# Patient Record
Sex: FEMALE | Race: WHITE | NOT HISPANIC OR LATINO | Employment: FULL TIME | URBAN - METROPOLITAN AREA
[De-identification: names, ages, dates, MRNs, and addresses within clinical notes are randomized per-mention and may not be internally consistent; named-entity substitution may affect disease eponyms.]

---

## 2017-01-11 ENCOUNTER — TELEPHONE (OUTPATIENT)
Dept: ONCOLOGY | Facility: CLINIC | Age: 47
End: 2017-01-11

## 2017-01-11 ENCOUNTER — TRANSCRIBE ORDERS (OUTPATIENT)
Dept: CARDIOLOGY | Facility: CLINIC | Age: 47
End: 2017-01-11

## 2017-01-11 DIAGNOSIS — R07.9 CHEST PAIN, UNSPECIFIED TYPE: Primary | ICD-10-CM

## 2017-01-11 NOTE — TELEPHONE ENCOUNTER
----- Message from Leesa Grullon sent at 1/11/2017  2:13 PM EST -----  Regarding: JERED / SUSANA  Contact: [phone]  PLEASE CALL PATIENT.  IS IT OKAY FOR HER TO TAKE TRUVIA AND STEVIA DUE TO HER PAST BREAST CANCER......SHE JUST NEEDS TO KNOW IF IT IS OKAY.

## 2017-01-24 ENCOUNTER — CONSULT (OUTPATIENT)
Dept: CARDIOLOGY | Facility: CLINIC | Age: 47
End: 2017-01-24

## 2017-01-24 VITALS
DIASTOLIC BLOOD PRESSURE: 70 MMHG | WEIGHT: 188 LBS | BODY MASS INDEX: 33.31 KG/M2 | HEIGHT: 63 IN | SYSTOLIC BLOOD PRESSURE: 110 MMHG | HEART RATE: 76 BPM

## 2017-01-24 DIAGNOSIS — E88.81 METABOLIC SYNDROME: ICD-10-CM

## 2017-01-24 DIAGNOSIS — R68.84 JAW PAIN: ICD-10-CM

## 2017-01-24 DIAGNOSIS — C50.912 RECURRENT CANCER OF LEFT BREAST (HCC): ICD-10-CM

## 2017-01-24 DIAGNOSIS — R42 DIZZINESS: ICD-10-CM

## 2017-01-24 DIAGNOSIS — R07.89 CHEST PRESSURE: Primary | ICD-10-CM

## 2017-01-24 DIAGNOSIS — E78.00 HYPERCHOLESTEREMIA: ICD-10-CM

## 2017-01-24 PROCEDURE — 93000 ELECTROCARDIOGRAM COMPLETE: CPT | Performed by: INTERNAL MEDICINE

## 2017-01-24 PROCEDURE — 99244 OFF/OP CNSLTJ NEW/EST MOD 40: CPT | Performed by: INTERNAL MEDICINE

## 2017-01-24 RX ORDER — ATORVASTATIN CALCIUM 20 MG/1
20 TABLET, FILM COATED ORAL DAILY
Qty: 90 TABLET | Refills: 3 | Status: SHIPPED | OUTPATIENT
Start: 2017-01-24 | End: 2017-10-04 | Stop reason: ALTCHOICE

## 2017-01-24 NOTE — LETTER
January 24, 2017     Ryan Madera MD  [address] [place] [id number]    Patient: Asuncion Flynn   YOB: 1970   Date of Visit: 1/24/2017       Dear Dr. Santy MD:    Thank you for referring Asuncion Flynn to me for evaluation. Below are the relevant portions of my assessment and plan of care.       Subjective   Asuncion Flynn is a 47 y.o. female came in today for her initial cardiac evaluation.  She has no previously diagnosed hypertension and diabetes.  She does have history of hypercholesterolemia, was been treated with diet.  She also has history of breast cancer, for which she has undergone surgery and chemotherapy.  She has been to the emergency room secondary to significant jaw pain radiating down into the upper part of the chest.  It lasted for few hours.  They kept her in the ER and apparently told it was not an MI and was sent home.  The symptoms subsided by the next day, and since then she had few episodes of such discomfort but they but not as significant and that long lasting.  Her lab work which was done recently showed that the LDL has gone up to 194.  Her liver functions were within normal limit.  She is also under a lot of stress recently.              Cardiac History  Past Surgical History   Procedure Laterality Date   • Mastectomy Bilateral 05/2011   • Echo - converted  12/04/2013     @[facility]. . EF 65%.       Current Outpatient Prescriptions   Medication Sig Dispense Refill   • escitalopram (LEXAPRO) 10 MG tablet Take 0.25 tablets by mouth Daily.  5   • loratadine (CLARITIN) 10 MG tablet Take 10 mg by mouth Daily.     • omeprazole (PriLOSEC) 40 MG capsule Take 1 capsule by mouth Daily.  2   • atorvastatin (LIPITOR) 20 MG tablet Take 1 tablet by mouth Daily. 90 tablet 3     No current facility-administered medications for this visit.        Allergies:  Ciprofloxacin; Codeine; Erythromycin; Keflex [cephalexin]; Morphine and related; Penicillins; and Sulfa  antibiotics              Assessment/Plan   At baseline her heart rate and blood pressure appears stable.  Her EKG showed sinus rhythm, small QRS complex and nonspecific ST-T changes.  Her clinical examination is unremarkable.  Had a long talk with her about her symptoms and also about her labs.  Since the LDL has been going up, I think she needs to be on a statin.  Lipitor 20 mg once a day was started.  Some of her symptoms is concerning.  I scheduled her to undergo a stress test to evaluate her functional status, chronotropic response and also to rule out ischemia.  She also need an echocardiogram to evaluate the LV function and also to evaluate for any pericardial effusion.  Based on the results, further recommendations will be made..  Asuncion was seen today for establish care and palpitations.    Diagnoses and all orders for this visit:    Chest pressure  -     Stress Test With Myocardial Perfusion One Day; Future  -     Stress Test With Myocardial Perfusion One Day    Jaw pain  -     Stress Test With Myocardial Perfusion One Day; Future  -     Stress Test With Myocardial Perfusion One Day    Dizziness  -     Stress Test With Myocardial Perfusion One Day; Future  -     Adult Transthoracic Echo Complete; Future  -     Stress Test With Myocardial Perfusion One Day  -     Adult Transthoracic Echo Complete    Hypercholesteremia    Metabolic syndrome  -     Adult Transthoracic Echo Complete; Future  -     Adult Transthoracic Echo Complete    Recurrent cancer of left breast  -     CBC & Differential  -     Comprehensive Metabolic Panel    Other orders  -     atorvastatin (LIPITOR) 20 MG tablet; Take 1 tablet by mouth Daily.                   If you have questions, please do not hesitate to call me. I look forward to following Asuncion along with you.         Sincerely,        Silas Davis MD        CC: No Recipients

## 2017-01-24 NOTE — MR AVS SNAPSHOT
[facility] CARDIOLOGY  [phone]                    Asuncion Flynn   1/24/2017 10:15 AM   Consult    Dept Phone:  [phone]   Encounter #:  [id number]    Provider:  Silas Davis MD   Department:  [facility] CARDIOLOGY                Your Full Care Plan              Today's Medication Changes          These changes are accurate as of: 1/24/17 10:51 AM.  If you have any questions, ask your nurse or doctor.               New Medication(s)Ordered:     atorvastatin 20 MG tablet   Commonly known as:  LIPITOR   Take 1 tablet by mouth Daily.   Started by:  Silas Davis MD            Where to Get Your Medications      These medications were sent to [facility]/pharmacy #[id number] - [place], [place] - [address] [address] - [phone]  - [phone]   [address] [address]     Phone:  [phone]     atorvastatin 20 MG tablet                  Your Updated Medication List          This list is accurate as of: 1/24/17 10:51 AM.  Always use your most recent med list.                atorvastatin 20 MG tablet   Commonly known as:  LIPITOR   Take 1 tablet by mouth Daily.       CLARITIN 10 MG tablet   Generic drug:  loratadine       escitalopram 10 MG tablet   Commonly known as:  LEXAPRO       omeprazole 40 MG capsule   Commonly known as:  priLOSEC               You Were Diagnosed With        Codes Comments    Chest pressure    -  Primary ICD-10-CM: R07.89  ICD-9-CM: 786.59     Jaw pain     ICD-10-CM: R68.84  ICD-9-CM: 784.92     Dizziness     ICD-10-CM: R42  ICD-9-CM: 780.4     Hypercholesteremia     ICD-10-CM: E78.00  ICD-9-CM: 272.0     Metabolic syndrome     ICD-10-CM: E88.81  ICD-9-CM: 277.7       Instructions     None    Patient Instructions History      Upcoming Appointments     Visit Type Date Time Department    CONSULT 1/24/2017 10:15 AM [facility]    FOLLOW UP 1 UNIT 4/5/2017 11:30 AM [facility] [place]    FOLLOW UP 7/27/2017  2:30 PM [facility] C[facility]  "[facility]      MyChart Signup     Our records indicate that you have declined [facility] MyChart signup. If you would like to sign up for [employer], please email [email or web address] or call [phone] to obtain an activation code.             Other Info from Your Visit           Your Appointments     Apr 05, 2017 11:30 AM EDT   FOLLOW UP with Chaz Lamb MD   [facility] HEMATOLOGY  AND ONCOLOGY ([place])    [address] [place] [address], [address]  [facility] [address]   [phone]            Jul 27, 2017  2:30 PM EDT   Follow Up with Silas Davis MD   [facility] CARDIOLOGY (--)    [address] [place] [address] [phone]           Arrive 15 minutes prior to appointment.              Allergies     Ciprofloxacin      Codeine Allergy     Erythromycin Allergy     Keflex [Cephalexin] Allergy     Morphine And Related Allergy     Penicillins Allergy     Sulfa Antibiotics Allergy       Reason for Visit     Establish Care Patient states she went to ER at [facility] due to severe jaw pain with nausea. She has been feeling very tired.    Palpitations Having alot of palpitations and pounding heart beats. She has had some lightheadedness.      Vital Signs     Blood Pressure Pulse Height Weight Body Mass Index Smoking Status    110/70 (BP Location: Right arm) 76 63\" (160 cm) 188 lb (85.3 kg) 33.3 kg/m2 Former Smoker      Problems and Diagnoses Noted     Chest pressure    -  Primary    Jaw pain        Dizziness        High cholesterol        Metabolic syndrome            "

## 2017-02-01 ENCOUNTER — OUTSIDE FACILITY SERVICE (OUTPATIENT)
Dept: CARDIOLOGY | Facility: CLINIC | Age: 47
End: 2017-02-01

## 2017-02-01 PROCEDURE — 93018 CV STRESS TEST I&R ONLY: CPT | Performed by: INTERNAL MEDICINE

## 2017-02-01 PROCEDURE — 78452 HT MUSCLE IMAGE SPECT MULT: CPT | Performed by: INTERNAL MEDICINE

## 2017-02-01 PROCEDURE — 93306 TTE W/DOPPLER COMPLETE: CPT | Performed by: INTERNAL MEDICINE

## 2017-02-07 ENCOUNTER — TELEPHONE (OUTPATIENT)
Dept: CARDIOLOGY | Facility: CLINIC | Age: 47
End: 2017-02-07

## 2017-02-07 NOTE — TELEPHONE ENCOUNTER
Patient called asking for results of echo and stress. Stress not in chart but patient states that she had done at [facility] the same day as the echo. Went over echo results. Will obtain stress and call patient with results.

## 2017-02-13 NOTE — TELEPHONE ENCOUNTER
Negative stress. As long she has no more CP, continue meds. If she is still having Chest pressure, then  Need Cath

## 2017-04-05 ENCOUNTER — OFFICE VISIT (OUTPATIENT)
Dept: ONCOLOGY | Facility: CLINIC | Age: 47
End: 2017-04-05

## 2017-04-05 VITALS
WEIGHT: 190 LBS | TEMPERATURE: 97.5 F | BODY MASS INDEX: 33.66 KG/M2 | RESPIRATION RATE: 16 BRPM | HEART RATE: 84 BPM | HEIGHT: 63 IN | DIASTOLIC BLOOD PRESSURE: 75 MMHG | SYSTOLIC BLOOD PRESSURE: 126 MMHG

## 2017-04-05 DIAGNOSIS — C50.912 RECURRENT CANCER OF LEFT BREAST (HCC): ICD-10-CM

## 2017-04-05 DIAGNOSIS — F41.9 CHRONIC ANXIETY: Primary | Chronic | ICD-10-CM

## 2017-04-05 PROCEDURE — 99213 OFFICE O/P EST LOW 20 MIN: CPT | Performed by: INTERNAL MEDICINE

## 2017-04-05 RX ORDER — FLUTICASONE PROPIONATE 50 MCG
SPRAY, SUSPENSION (ML) NASAL
Refills: 3 | COMMUNITY
Start: 2017-03-13 | End: 2017-10-04

## 2017-04-05 NOTE — PROGRESS NOTES
Chief Complaint   Follow-up axillary recurrent left breast cancer-original left breast cancer diagnosed April 2011, followed by bilateral mastectomies with permanent prostheses, hormone positive and HER-2 negative with 8 negative lymph nodes, with subsequent left axillary lymph node recurrence November 2013 with 3 of 4 axillary lymph nodes involved by tumor, status post surgical resection followed by systemic chemotherapy and several attempts at hormonal manipulation-hormone blocking therapy not tolerated by patient    PROBLEM LIST   Patient Active Problem List   Diagnosis   • Recurrent cancer of left breast   • Chronic anxiety       HISTORY OF PRESENT ILLNESS:   Approaching 4 years from her axillary recurrence.  Feeling well.  Working regularly.  Back in December she had bilateral jaw pain that was fairly severe that occurred out of nowhere.  She was seen by her local emergency room physician and workup was negative in terms of any cardiac issues.  This was confirmed by formal cardiology evaluation.  She's had a very mild version of this happen very briefly since on only one occasion.  She does clench her jaw a lot of work when she is concentrating or working.    Past Medical History, Past Surgical History, Social History, Family History have been reviewed and are without significant changes except as mentioned.      Medications:      Current Outpatient Prescriptions:   •  atorvastatin (LIPITOR) 20 MG tablet, Take 1 tablet by mouth Daily., Disp: 90 tablet, Rfl: 3  •  escitalopram (LEXAPRO) 10 MG tablet, Take 0.25 tablets by mouth Daily., Disp: , Rfl: 5  •  fluticasone (FLONASE) 50 MCG/ACT nasal spray, USE 1 SPRAY IN EACH NOSTRIL DAILY, Disp: , Rfl: 3  •  loratadine (CLARITIN) 10 MG tablet, Take 10 mg by mouth Daily., Disp: , Rfl:   •  omeprazole (PriLOSEC) 40 MG capsule, Take 1 capsule by mouth Daily., Disp: , Rfl: 2    ALLERGIES:    Allergies   Allergen Reactions   • Ciprofloxacin    • Codeine    • Erythromycin   "  • Keflex [Cephalexin]    • Morphine And Related    • Penicillins    • Sulfa Antibiotics        ROS:  Review of Systems   Constitutional: Negative for activity change and appetite change.   HENT: Negative for mouth sores, sinus pressure and voice change.    Eyes: Negative for visual disturbance.   Respiratory: Negative for shortness of breath.    Cardiovascular: Negative for chest pain.   Gastrointestinal: Negative for abdominal pain and vomiting.   Genitourinary: Negative for dysuria.   Musculoskeletal: Negative for arthralgias and myalgias.   Skin: Negative for color change.   Neurological: Negative for dizziness, syncope and headaches.   Hematological: Negative for adenopathy.   Psychiatric/Behavioral: Negative for confusion, sleep disturbance and suicidal ideas. The patient is not nervous/anxious.          Objective:    /75  Pulse 84  Temp 97.5 °F (36.4 °C) (Temporal Artery )   Resp 16  Ht 63\" (160 cm)  Wt 190 lb (86.2 kg)  BMI 33.66 kg/m2    Physical Exam   Constitutional: She is oriented to person, place, and time. She appears well-developed and well-nourished. No distress.   Appears healthy youthful and vigorous.   HENT:   Head: Normocephalic.   Mouth/Throat: Oropharynx is clear and moist.   Asymmetric sclera nonicteric jaw muscles normal no jaw tenderness no lymph nodes palpable in the head and neck or for that matter anywhere else   Eyes: Conjunctivae and EOM are normal. No scleral icterus.   Neck: Normal range of motion. Neck supple. No thyromegaly present.   Cardiovascular: Normal rate, regular rhythm and normal heart sounds.    No murmur heard.  Pulmonary/Chest: Effort normal and breath sounds normal. She has no wheezes. She has no rales.   Breasts prostheses in place bilaterally with excellent cosmesis.  No worrisome mass palpable   Abdominal: Soft. Bowel sounds are normal. She exhibits no distension and no mass. There is no tenderness.   Musculoskeletal: She exhibits no edema or " tenderness.   Lymphadenopathy:     She has no cervical adenopathy.   Neurological: She is alert and oriented to person, place, and time. She displays normal reflexes. No cranial nerve deficit.   Skin: Skin is warm and dry. No rash noted.   Psychiatric: She has a normal mood and affect. Judgment normal.   Vitals reviewed.             RECENT LABS:  Hematology WBC   Date Value Ref Range Status   03/10/2014 4.90 3.50 - 10.80 K/mcL Final     Hemoglobin   Date Value Ref Range Status   03/10/2014 11.8 11.5 - 15.5 g/dL Final     Hematocrit   Date Value Ref Range Status   03/10/2014 34.9 34.5 - 44.0 % Final     MCV   Date Value Ref Range Status   03/10/2014 95.8 80.0 - 99.0 fL Final     RDW   Date Value Ref Range Status   03/10/2014 15.5 (H) 11.3 - 14.5 % Final     MPV   Date Value Ref Range Status   03/10/2014 7.1 fL Final     Platelets   Date Value Ref Range Status   03/10/2014 320 150 - 450 K/mcL Final     Neutrophils Absolute   Date Value Ref Range Status   03/10/2014 2.70 1.50 - 8.30 K/mcL Final     Lymphocytes Absolute   Date Value Ref Range Status   03/10/2014 1.60 0.60 - 4.80 K/mcL Final     Monocytes Absolute   Date Value Ref Range Status   03/10/2014 0.60 0.00 - 1.00 K/mcL Final       Glucose   Date Value Ref Range Status   03/10/2014 108 (H) 70 - 100 mg/dL Final     Sodium   Date Value Ref Range Status   03/10/2014 140 136 - 145 mmol/L Final     Potassium   Date Value Ref Range Status   03/10/2014 4.2 3.4 - 5.4 mmol/L Final     CO2   Date Value Ref Range Status   03/10/2014 29 20 - 31 mmol/L Final     Chloride   Date Value Ref Range Status   03/10/2014 105 98 - 107 mmol/L Final     Anion Gap   Date Value Ref Range Status   03/10/2014 6 3 - 11 mmol/L Final     Creatinine   Date Value Ref Range Status   03/10/2014 0.7 0.6 - 1.3 mg/dL Final     BUN   Date Value Ref Range Status   03/10/2014 15 6 - 20 mg/dL Final     Calcium   Date Value Ref Range Status   03/10/2014 8.7 8.7 - 10.4 mg/dL Final     Alkaline Phosphatase    Date Value Ref Range Status   03/10/2014 128 (H) 25 - 100 Units/L Final     Total Protein   Date Value Ref Range Status   03/10/2014 6.6 6.4 - 8.3 g/dL Final     ALT (SGPT)   Date Value Ref Range Status   03/10/2014 41 (H) 7 - 40 Units/L Final     AST (SGOT)   Date Value Ref Range Status   03/10/2014 30 8 - 33 Units/L Final     Total Bilirubin   Date Value Ref Range Status   03/10/2014 0.4 0.3 - 1.2 mg/dL Final     Albumin   Date Value Ref Range Status   03/10/2014 4.3 3.4 - 4.8 g/dL Final          Perf Status: 0    Assessment/Plan    Diagnoses and all orders for this visit:    Chronic anxiety    Recurrent cancer of left breast      Regionally recurrent left breast cancer-no evidence of further recurrence.  The patient is doing well.    Bilateral jaw pain several months ago which I think was very likely muscle spasm.  Reassurance given.  I'll otherwise see her back in 6 months or so    Chaz Lamb MD , 4/5/2017    CC:

## 2017-06-09 ENCOUNTER — TELEPHONE (OUTPATIENT)
Dept: ONCOLOGY | Facility: CLINIC | Age: 47
End: 2017-06-09

## 2017-06-09 NOTE — TELEPHONE ENCOUNTER
----- Message from Martina Gaston sent at 6/9/2017  3:21 PM EDT -----  Regarding: JERED-QUESTIONS  Contact: [phone]  Patient called can she have sugar free things now, and can she take herbs to loose weight? Please call.

## 2017-06-09 NOTE — TELEPHONE ENCOUNTER
Per BENJIE Gray there is no reason why she cannot have sugarfree products or herbs to loose weight.  She should check with her primary care on the specific herbs for loosing weight.  Patient stated understanding.

## 2017-06-21 ENCOUNTER — TELEPHONE (OUTPATIENT)
Dept: ONCOLOGY | Facility: CLINIC | Age: 47
End: 2017-06-21

## 2017-06-21 NOTE — TELEPHONE ENCOUNTER
Per Dr. Lamb,  Order a cheap compression sleeve, not too tight.  Refer to PT for lymphadema.  [place].

## 2017-06-21 NOTE — TELEPHONE ENCOUNTER
Pt said she has an appt with dr mckeon on the  28th   Please wait to schedule her physical therapy until after she talks to him.     This message is shelly              Noted by Shelly

## 2017-06-21 NOTE — TELEPHONE ENCOUNTER
----- Message from Martina PATIÑO Marilin sent at 6/20/2017  1:36 PM EDT -----  Regarding: JERED- LYMPH NODES IN ARM  Contact: [phone]  Patient called her left arm where are the lymph nodes were removed it has been hurting for the past 6 months when she gets home she elevates this and it feels better but seems to be an everyday thing has been taking tylenol when she can't handle it some swelling but not bad could this be arthritis.

## 2017-06-28 ENCOUNTER — OFFICE VISIT (OUTPATIENT)
Dept: ONCOLOGY | Facility: CLINIC | Age: 47
End: 2017-06-28

## 2017-06-28 VITALS
SYSTOLIC BLOOD PRESSURE: 130 MMHG | TEMPERATURE: 98 F | WEIGHT: 197 LBS | RESPIRATION RATE: 16 BRPM | DIASTOLIC BLOOD PRESSURE: 75 MMHG | HEIGHT: 63 IN | BODY MASS INDEX: 34.91 KG/M2 | HEART RATE: 77 BPM

## 2017-06-28 DIAGNOSIS — I89.0 LYMPHEDEMA OF LEFT UPPER EXTREMITY: Primary | ICD-10-CM

## 2017-06-28 PROCEDURE — 99213 OFFICE O/P EST LOW 20 MIN: CPT | Performed by: INTERNAL MEDICINE

## 2017-07-07 ENCOUNTER — TELEPHONE (OUTPATIENT)
Dept: ONCOLOGY | Facility: CLINIC | Age: 47
End: 2017-07-07

## 2017-07-07 NOTE — TELEPHONE ENCOUNTER
Spoke with patient. Her insurance prefers she have physical therapy through [facility]. She has an evaluation set up for 7/13/17. I called [facility] and gave them my number in case they need any information for the appointment.

## 2017-07-20 ENCOUNTER — DOCUMENTATION (OUTPATIENT)
Dept: CARDIOLOGY | Facility: CLINIC | Age: 47
End: 2017-07-20

## 2017-10-04 ENCOUNTER — OFFICE VISIT (OUTPATIENT)
Dept: ONCOLOGY | Facility: CLINIC | Age: 47
End: 2017-10-04

## 2017-10-04 VITALS
HEART RATE: 73 BPM | SYSTOLIC BLOOD PRESSURE: 127 MMHG | TEMPERATURE: 97.8 F | WEIGHT: 195.6 LBS | HEIGHT: 63 IN | RESPIRATION RATE: 16 BRPM | BODY MASS INDEX: 34.66 KG/M2 | DIASTOLIC BLOOD PRESSURE: 80 MMHG

## 2017-10-04 DIAGNOSIS — C50.912 RECURRENT CANCER OF LEFT BREAST (HCC): Primary | ICD-10-CM

## 2017-10-04 DIAGNOSIS — I89.0 LYMPHEDEMA OF LEFT UPPER EXTREMITY: ICD-10-CM

## 2017-10-04 PROCEDURE — 99214 OFFICE O/P EST MOD 30 MIN: CPT | Performed by: INTERNAL MEDICINE

## 2017-10-04 RX ORDER — PANTOPRAZOLE SODIUM 40 MG/1
40 TABLET, DELAYED RELEASE ORAL DAILY
COMMUNITY
End: 2018-05-17

## 2017-10-04 NOTE — PROGRESS NOTES
Chief Complaint   Follow-up axillary recurrence of left breast cancer-C chief complaint of April 5, 2017's office note for complete details    PROBLEM LIST   Patient Active Problem List   Diagnosis   • Recurrent cancer of left breast   • Chronic anxiety   • Lymphedema of left upper extremity       HISTORY OF PRESENT ILLNESS:   This very pleasant 47-year-old lady returns for follow-up.  She continues to work.  Overall her health is been pretty good.  She continues to have intermittent albeit mild issues with her chronic left upper extremity lymphedema.  She also notices some facial twitching from time to time which does not seem to be provoked but which does seem to improve when she sits down and tries to relax.  She's had no headache.  She's had no syncopal episodes.    Her chronic anxiety definitely continues to be an issue.  She is also worried about her memory retention and whether or not she might have chemo brain.  Her mother who is with her reports that she does seem to forget a number of small items but is never forgotten anything that is led to any count of bodily harm or any kind of problems with her children and her family.    Past Medical History, Past Surgical History, Social History, Family History have been reviewed and are without significant changes except as mentioned.      Medications:      Current Outpatient Prescriptions:   •  escitalopram (LEXAPRO) 10 MG tablet, Take 0.25 tablets by mouth Daily., Disp: , Rfl: 5  •  loratadine (CLARITIN) 10 MG tablet, Take 10 mg by mouth Daily., Disp: , Rfl:   •  pantoprazole (PROTONIX) 40 MG EC tablet, Take 40 mg by mouth Daily., Disp: , Rfl:     ALLERGIES:    Allergies   Allergen Reactions   • Ciprofloxacin    • Codeine    • Erythromycin    • Keflex [Cephalexin]    • Morphine And Related    • Penicillins    • Percocet [Oxycodone-Acetaminophen] Nausea And Vomiting   • Sulfa Antibiotics    • Fluticasone Anxiety and Tinnitus       ROS:  Review of Systems  "  Constitutional: Negative for activity change and appetite change.   HENT: Negative for mouth sores, sinus pressure and voice change.    Eyes: Negative for visual disturbance.   Respiratory: Negative for shortness of breath.    Cardiovascular: Negative for chest pain.   Gastrointestinal: Negative for abdominal pain and vomiting.   Genitourinary: Negative for dysuria.   Musculoskeletal: Negative for arthralgias and myalgias.        Chronic left upper extremity lymphedema symptoms mentioned   Skin: Negative for color change.   Neurological: Negative for dizziness, syncope and headaches.        Mild chronic stable neurologic symptoms-quite intermittent-mentioned above   Hematological: Negative for adenopathy.   Psychiatric/Behavioral: Negative for confusion, sleep disturbance and suicidal ideas. The patient is not nervous/anxious.         Chronic anxiety mentioned above           Objective:    /80  Pulse 73  Temp 97.8 °F (36.6 °C) (Temporal Artery )   Resp 16  Ht 63\" (160 cm)  Wt 195 lb 9.6 oz (88.7 kg)  BMI 34.65 kg/m2    Physical Exam   Constitutional: She is oriented to person, place, and time. She appears well-developed and well-nourished. No distress.   Awake alert smiles readily.  On the other hand she remains somewhat anxious in appearance and she actually teared once while she was here in the office.  She has excellent insight.   HENT:   Head: Normocephalic.   Mouth/Throat: Oropharynx is clear and moist.   Eyes: Conjunctivae and EOM are normal. No scleral icterus.   Neck: Normal range of motion. Neck supple. No thyromegaly present.   Cardiovascular: Normal rate, regular rhythm and normal heart sounds.    No murmur heard.  Pulmonary/Chest: Effort normal and breath sounds normal. She has no wheezes. She has no rales.   Bilateral breast implants in place with good cosmesis   Abdominal: Soft. Bowel sounds are normal. She exhibits no distension and no mass. There is no tenderness.   Musculoskeletal: She " exhibits no edema or tenderness.   Lymphadenopathy:     She has no cervical adenopathy.   Neurological: She is alert and oriented to person, place, and time. She displays normal reflexes. No cranial nerve deficit.   Skin: Skin is warm and dry. No rash noted.   Psychiatric: She has a normal mood and affect. Judgment normal.   Vitals reviewed.             RECENT LABS:  Hematology WBC   Date Value Ref Range Status   03/10/2014 4.90 3.50 - 10.80 K/mcL Final     Hemoglobin   Date Value Ref Range Status   03/10/2014 11.8 11.5 - 15.5 g/dL Final     Hematocrit   Date Value Ref Range Status   03/10/2014 34.9 34.5 - 44.0 % Final     MCV   Date Value Ref Range Status   03/10/2014 95.8 80.0 - 99.0 fL Final     RDW   Date Value Ref Range Status   03/10/2014 15.5 (H) 11.3 - 14.5 % Final     MPV   Date Value Ref Range Status   03/10/2014 7.1 fL Final     Platelets   Date Value Ref Range Status   03/10/2014 320 150 - 450 K/mcL Final     Neutrophils Absolute   Date Value Ref Range Status   03/10/2014 2.70 1.50 - 8.30 K/mcL Final     Lymphocytes Absolute   Date Value Ref Range Status   03/10/2014 1.60 0.60 - 4.80 K/mcL Final     Monocytes Absolute   Date Value Ref Range Status   03/10/2014 0.60 0.00 - 1.00 K/mcL Final       Glucose   Date Value Ref Range Status   03/10/2014 108 (H) 70 - 100 mg/dL Final     Sodium   Date Value Ref Range Status   03/10/2014 140 136 - 145 mmol/L Final     Potassium   Date Value Ref Range Status   03/10/2014 4.2 3.4 - 5.4 mmol/L Final     CO2   Date Value Ref Range Status   03/10/2014 29 20 - 31 mmol/L Final     Chloride   Date Value Ref Range Status   03/10/2014 105 98 - 107 mmol/L Final     Anion Gap   Date Value Ref Range Status   03/10/2014 6 3 - 11 mmol/L Final     Creatinine   Date Value Ref Range Status   03/10/2014 0.7 0.6 - 1.3 mg/dL Final     BUN   Date Value Ref Range Status   03/10/2014 15 6 - 20 mg/dL Final     Calcium   Date Value Ref Range Status   03/10/2014 8.7 8.7 - 10.4 mg/dL Final      Alkaline Phosphatase   Date Value Ref Range Status   03/10/2014 128 (H) 25 - 100 Units/L Final     Total Protein   Date Value Ref Range Status   03/10/2014 6.6 6.4 - 8.3 g/dL Final     ALT (SGPT)   Date Value Ref Range Status   03/10/2014 41 (H) 7 - 40 Units/L Final     AST (SGOT)   Date Value Ref Range Status   03/10/2014 30 8 - 33 Units/L Final     Total Bilirubin   Date Value Ref Range Status   03/10/2014 0.4 0.3 - 1.2 mg/dL Final     Albumin   Date Value Ref Range Status   03/10/2014 4.3 3.4 - 4.8 g/dL Final          Perf Status:0    Assessment/Plan    Diagnoses and all orders for this visit:    Recurrent cancer of left breast    Lymphedema of left upper extremity      I think that Asuncion is doing very nicely.  She is now over 4 years out from her left axillary recurrence and has no evidence of further recurrence.  Her gynecologist suggested that she try Avista adjuvantly.  I would be happy to do this but the patient is not terribly inclined to do so and I have to say that every single drug that I tried her on, regardless of dose reduction and frequency reduction, were not tolerated.  At this late date I can't say that putting her on anything at this point would be beneficial anyway.    I suspect that her memory as I should quite normal for age and degree of anxiety.  She has always been anxious and as she well knows, this contributes to some of her memory issues.  I offered to send her to a neurologist for formal cognitive testing but she really doesn't want to do that.  I think she is more interested in the reassurance that I can give her.    Mike left upper cervical lymphedema-minimal at this point.    Left facial twitching.  This is intermittent and not progressive.  Again I suspect this is probably related to her anxiety.  I offered to refer her to a neurologist re guarding this as well.  At present we will merely monitor this.  I'll plan on seeing her back in 3 months or so.  I told her to call me if  she had any issues whatsoever.  She is of very very dear woman who has done very well but who has a real issue with her chronic anxiety.      Chaz Lamb MD , 10/4/2017    CC:

## 2017-10-18 ENCOUNTER — TELEPHONE (OUTPATIENT)
Dept: ONCOLOGY | Facility: CLINIC | Age: 47
End: 2017-10-18

## 2017-10-18 NOTE — TELEPHONE ENCOUNTER
----- Message from Jemma Zapata sent at 10/18/2017  1:25 PM EDT -----  Regarding: JERED - NEEDS LETTER FOR DISABILITY   Contact: [phone]  PATIENT CALLED NEEDING A LETTER REGARDING HER CONCENTRATION AND FOCUS. SHE IS NEEDING THIS FOR DISABILITY BECAUSE SHE THINKS SHE IS GOING TO LOOSE HER JOB.     SHE WOULD LIKE TO TALK TO DR. LAWTON ABOUT THIS.

## 2017-10-18 NOTE — TELEPHONE ENCOUNTER
Per Dr. Lamb, we can refer patient to neurology for her memory loss and concentration.  Patient will need a specialist in this field to determine is she is disabled.  Patient stated understanding and requested a referral.

## 2017-10-19 DIAGNOSIS — R41.840 POOR CONCENTRATION: ICD-10-CM

## 2017-10-19 DIAGNOSIS — R41.3 MEMORY LOSS: Primary | ICD-10-CM

## 2017-10-19 DIAGNOSIS — F41.9 CHRONIC ANXIETY: Chronic | ICD-10-CM

## 2017-10-19 DIAGNOSIS — C50.912 RECURRENT CANCER OF LEFT BREAST (HCC): ICD-10-CM

## 2017-11-22 ENCOUNTER — OFFICE VISIT (OUTPATIENT)
Dept: NEUROLOGY | Facility: CLINIC | Age: 47
End: 2017-11-22

## 2017-11-22 VITALS
SYSTOLIC BLOOD PRESSURE: 118 MMHG | RESPIRATION RATE: 18 BRPM | BODY MASS INDEX: 33.84 KG/M2 | DIASTOLIC BLOOD PRESSURE: 74 MMHG | HEART RATE: 64 BPM | WEIGHT: 191 LBS | HEIGHT: 63 IN

## 2017-11-22 DIAGNOSIS — R41.3 MEMORY LOSS: Primary | ICD-10-CM

## 2017-11-22 PROCEDURE — 99245 OFF/OP CONSLTJ NEW/EST HI 55: CPT | Performed by: PSYCHIATRY & NEUROLOGY

## 2017-11-22 RX ORDER — MECLIZINE HCL 12.5 MG/1
12.5 TABLET ORAL 3 TIMES DAILY PRN
COMMUNITY

## 2017-11-22 RX ORDER — ACETAMINOPHEN 500 MG
500 TABLET ORAL EVERY 6 HOURS PRN
COMMUNITY

## 2017-11-22 NOTE — PROGRESS NOTES
Subjective   Patient ID: Asuncion Flynn is a 47 y.o. female     Chief Complaint   Patient presents with   • Tremors   • Dizziness   • Concentration Issues        History of Present Illness    47 y.o. woman referred by Dr Lamb for memory issues.  Notes after first round of chemo in 2012 noticed mild memory issues.   killed July 3, 2015.  After event trouble with concentration and focus.  Notices at work she is getting behind.  Changed duties at work to only focus on one task at a time.  Memory problems increase her anxiety.  Trouble shifting topics and maintaining focus.      Intermittent twitching of left face, tongue and arm. Lasts for 1 -2 minutes.  Frequency is once a week.  Relaxing or elevating arm improves tremor.         Reviewed Dr Lamb's notes:    H/O left breast cancer April 2011 followed by bilateral mastectomies with permanent protheses, isolated left axillary recurrence 10/17/2013,    S/P 5 cycles of FEC chemo.    Pt reports decreased ST memory, facial twitching and left arm shaking.  Pt is concerned she is going to lose her job due to memory issues.       Past Medical History:   Diagnosis Date   • Anxiety associated with depression    • Cancer     hx of breast cancer x2   • Depression    • H/O mastectomy    • Hearing difficulty     Bilaterally. Recurrent ear infections as well as allergies and fluid in her ears.Not progressive.    • Hyperlipidemia    • Hypothyroidism    • Multiple allergies    • Panic disorder    • Twitching     4/7/2016: Little bit of twitching in the left upper extremity that began about 3 months or so ago, again it is not progressive in the slightest but it is noticeable.Left upper extremity movement disorder, non-progressive, could be related to anxiety.    • Vitamin D deficiency      Family History   Problem Relation Age of Onset   • Hypertension Mother    • Depression Mother    • Mental illness Mother    • Cancer Father    • Heart disease Father    • Heart disease  Maternal Grandmother    • Stroke Maternal Grandmother    • Dementia Maternal Grandmother    • Cancer Maternal Grandfather    • Multiple sclerosis Maternal Grandfather    • Cancer Paternal Grandmother    • Diabetes Paternal Grandmother    • Kidney disease Paternal Grandmother    • Heart attack Paternal Grandfather    • Mental illness Maternal Aunt    • Parkinsonism Maternal Aunt    • Diabetes Maternal Aunt    • Multiple sclerosis Maternal Uncle    • Multiple sclerosis Paternal Aunt      Social History     Social History   • Marital status:      Spouse name: N/A   • Number of children: N/A   • Years of education: N/A     Social History Main Topics   • Smoking status: Former Smoker     Types: Cigarettes     Quit date: 8/21/2005   • Smokeless tobacco: Never Used   • Alcohol use No   • Drug use: No   • Sexual activity: Defer     Other Topics Concern   • None     Social History Narrative       Review of Systems   Constitutional: Positive for fatigue. Negative for activity change and unexpected weight change.   HENT: Negative for tinnitus and trouble swallowing.    Eyes: Negative for photophobia and visual disturbance.   Respiratory: Negative for apnea, cough and choking.    Cardiovascular: Negative for leg swelling.   Gastrointestinal: Negative for nausea and vomiting.   Endocrine: Negative for cold intolerance and heat intolerance.   Genitourinary: Negative for difficulty urinating, frequency, menstrual problem and urgency.   Musculoskeletal: Negative for back pain, gait problem, myalgias and neck pain.   Skin: Negative for color change and rash.   Allergic/Immunologic: Negative for immunocompromised state.   Neurological: Positive for facial asymmetry. Negative for dizziness, tremors, seizures, syncope, speech difficulty, weakness, light-headedness, numbness and headaches.   Hematological: Negative for adenopathy. Does not bruise/bleed easily.   Psychiatric/Behavioral: Positive for decreased concentration and  "dysphoric mood. Negative for behavioral problems, confusion, hallucinations and sleep disturbance. The patient is nervous/anxious.        Objective     Vitals:    11/22/17 1010   BP: 118/74   BP Location: Right arm   Patient Position: Sitting   Cuff Size: Adult   Pulse: 64   Resp: 18   Weight: 191 lb (86.6 kg)   Height: 63\" (160 cm)       Neurologic Exam     Mental Status   Oriented to person, place, and time.   Registration: recalls 3 of 3 objects. Recall at 5 minutes: recalls 3 of 3 objects. Follows 3 step commands.   Attention: normal. Concentration: normal.   Speech: speech is normal   Level of consciousness: alert  Knowledge: good and consistent with education.   Able to name object. Able to read. Able to repeat. Able to write. Normal comprehension.     Cranial Nerves     CN II   Visual fields full to confrontation.   Visual acuity: normal  Right visual field deficit: none  Left visual field deficit: none     CN III, IV, VI   Pupils are equal, round, and reactive to light.  Extraocular motions are normal.   Right pupil: Shape: regular. Reactivity: brisk. Consensual response: intact.   Left pupil: Shape: regular. Reactivity: brisk. Consensual response: intact.   Nystagmus: none   Diplopia: none  Ophthalmoparesis: none  Upgaze: normal  Downgaze: normal  Conjugate gaze: present  Vestibulo-ocular reflex: present    CN V   Facial sensation intact.   Right corneal reflex: normal  Left corneal reflex: normal    CN VII   Right facial weakness: none  Left facial weakness: none    CN VIII   Hearing: intact    CN IX, X   Palate: symmetric  Right gag reflex: normal  Left gag reflex: normal    CN XI   Right sternocleidomastoid strength: normal  Left sternocleidomastoid strength: normal    CN XII   Tongue: not atrophic  Fasciculations: absent  Tongue deviation: none    Motor Exam   Muscle bulk: normal  Overall muscle tone: normal  Right arm tone: normal  Left arm tone: normal  Right leg tone: normal  Left leg tone: " normal    Strength   Strength 5/5 throughout.     Sensory Exam   Light touch normal.   Vibration normal.   Proprioception normal.   Pinprick normal.     Gait, Coordination, and Reflexes     Gait  Gait: normal    Coordination   Romberg: negative  Finger to nose coordination: normal  Heel to shin coordination: normal  Tandem walking coordination: normal    Tremor   Resting tremor: absent  Intention tremor: absent  Action tremor: absent    Reflexes   Reflexes 2+ except as noted.       Physical Exam   Constitutional: She is oriented to person, place, and time. Vital signs are normal. She appears well-developed and well-nourished.   HENT:   Head: Normocephalic and atraumatic.   Eyes: EOM and lids are normal. Pupils are equal, round, and reactive to light.   Fundoscopic exam:       The right eye shows no exudate, no hemorrhage and no papilledema. The right eye shows venous pulsations.        The left eye shows no exudate, no hemorrhage and no papilledema. The left eye shows venous pulsations.   Neck: Normal range of motion and phonation normal. Normal carotid pulses present. Carotid bruit is not present. No thyroid mass and no thyromegaly present.   Cardiovascular: Normal rate, regular rhythm and normal heart sounds.    Pulmonary/Chest: Effort normal.   Neurological: She is oriented to person, place, and time. She has normal strength. She has a normal Finger-Nose-Finger Test, a normal Heel to Shin Test, a normal Romberg Test and a normal Tandem Gait Test. Gait normal.   Skin: Skin is warm and dry.   Psychiatric: She has a normal mood and affect. Her speech is normal.   Nursing note and vitals reviewed.      Hospital Outpatient Visit on 04/07/2014   Component Date Value Ref Range Status   • Glucose 04/07/2014 96  75 - 125 mg/dL Final     CBC,CMP 10/3/17 - [facility]      Assessment/Plan     Problem List Items Addressed This Visit        Other    Memory loss - Primary    Current Assessment & Plan     Decreased memory/attention and  concentration    MRi Brain, B12,TSH    Refer SLP          Relevant Orders    TSH    Vitamin B12 & Folate    MRI Brain With & Without Contrast    Ambulatory Referral to Speech Therapy             No Follow-up on file.

## 2017-12-01 ENCOUNTER — TELEPHONE (OUTPATIENT)
Dept: NEUROLOGY | Facility: CLINIC | Age: 47
End: 2017-12-01

## 2017-12-01 NOTE — TELEPHONE ENCOUNTER
Patient called, stated that she can not afford to drive to [place] for her therapy that you ordered at her last appointment. She was wondering if it would be ok if she went to the [facility] in [place], [place] for her therapy. Please advise. Thanks!!

## 2017-12-21 ENCOUNTER — OFFICE VISIT (OUTPATIENT)
Dept: NEUROLOGY | Facility: CLINIC | Age: 47
End: 2017-12-21

## 2017-12-21 VITALS
RESPIRATION RATE: 16 BRPM | OXYGEN SATURATION: 97 % | WEIGHT: 187 LBS | SYSTOLIC BLOOD PRESSURE: 116 MMHG | DIASTOLIC BLOOD PRESSURE: 80 MMHG | BODY MASS INDEX: 33.13 KG/M2 | HEIGHT: 63 IN | HEART RATE: 81 BPM

## 2017-12-21 DIAGNOSIS — R41.3 MEMORY LOSS: Primary | ICD-10-CM

## 2017-12-21 PROCEDURE — 99213 OFFICE O/P EST LOW 20 MIN: CPT | Performed by: PSYCHIATRY & NEUROLOGY

## 2017-12-21 NOTE — PROGRESS NOTES
Subjective   Patient ID: Asuncion Flynn is a 47 y.o. female     Chief Complaint   Patient presents with   • Memory Loss        History of Present Illness    47 y.o. woman returns in follow up for memory issues.  Last visit on 11/22/17 ordered labs, MRI Brain and referred to SLP.    Labs - TSH, B12, CBC,CMP - [facility]  MRI Brain, my review of films, normal    Scheduled SLP appt but has not yet started.  Continued to have decreased att/concentration and shifting topics.  No other significant changes.     Problem history:    Notes after first round of chemo in 2012 noticed mild memory issues.   killed July 3, 2015.  After event trouble with concentration and focus.  Notices at work she is getting behind.  Changed duties at work to only focus on one task at a time.  Memory problems increase her anxiety.  Trouble shifting topics and maintaining focus.      Intermittent twitching of left face, tongue and arm. Lasts for 1 -2 minutes.  Frequency is once a week.  Relaxing or elevating arm improves tremor.         Reviewed Dr Lamb's notes:    H/O left breast cancer April 2011 followed by bilateral mastectomies with permanent protheses, isolated left axillary recurrence 10/17/2013,    S/P 5 cycles of FEC chemo.    Pt reports decreased ST memory, facial twitching and left arm shaking.  Pt is concerned she is going to lose her job due to memory issues.       Past Medical History:   Diagnosis Date   • Anxiety associated with depression    • Cancer     hx of breast cancer x2   • Depression    • H/O mastectomy    • Hearing difficulty     Bilaterally. Recurrent ear infections as well as allergies and fluid in her ears.Not progressive.    • Hyperlipidemia    • Hypothyroidism    • Multiple allergies    • Panic disorder    • Twitching     4/7/2016: Little bit of twitching in the left upper extremity that began about 3 months or so ago, again it is not progressive in the slightest but it is noticeable.Left upper extremity movement  disorder, non-progressive, could be related to anxiety.    • Vitamin D deficiency      Family History   Problem Relation Age of Onset   • Hypertension Mother    • Depression Mother    • Mental illness Mother    • Cancer Father    • Heart disease Father    • Heart disease Maternal Grandmother    • Stroke Maternal Grandmother    • Dementia Maternal Grandmother    • Cancer Maternal Grandfather    • Multiple sclerosis Maternal Grandfather    • Cancer Paternal Grandmother    • Diabetes Paternal Grandmother    • Kidney disease Paternal Grandmother    • Heart attack Paternal Grandfather    • Mental illness Maternal Aunt    • Parkinsonism Maternal Aunt    • Diabetes Maternal Aunt    • Multiple sclerosis Maternal Uncle    • Multiple sclerosis Paternal Aunt      Social History     Social History   • Marital status:      Spouse name: N/A   • Number of children: N/A   • Years of education: N/A     Social History Main Topics   • Smoking status: Former Smoker     Types: Cigarettes     Quit date: 8/21/2005   • Smokeless tobacco: Never Used   • Alcohol use No   • Drug use: No   • Sexual activity: Defer     Other Topics Concern   • None     Social History Narrative       Review of Systems   Constitutional: Negative for activity change and unexpected weight change.   HENT: Negative for tinnitus and trouble swallowing.    Eyes: Negative for photophobia and visual disturbance.   Respiratory: Negative for apnea and choking.    Cardiovascular: Negative for leg swelling.   Endocrine: Negative for cold intolerance and heat intolerance.   Genitourinary: Negative for difficulty urinating, frequency, menstrual problem and urgency.   Musculoskeletal: Negative for back pain and gait problem.   Skin: Negative for color change.   Allergic/Immunologic: Negative for immunocompromised state.   Neurological: Positive for facial asymmetry. Negative for dizziness, tremors, seizures, syncope, speech difficulty and light-headedness.  "  Hematological: Negative for adenopathy. Does not bruise/bleed easily.   Psychiatric/Behavioral: Positive for decreased concentration and dysphoric mood. Negative for behavioral problems, confusion, hallucinations and sleep disturbance. The patient is nervous/anxious.        Objective     Vitals:    12/21/17 1000   BP: 116/80   BP Location: Right arm   Patient Position: Sitting   Cuff Size: Adult   Pulse: 81   Resp: 16   SpO2: 97%   Weight: 84.8 kg (187 lb)   Height: 160 cm (63\")       Neurologic Exam     Mental Status   Registration: recalls 3 of 3 objects. Recall at 5 minutes: recalls 3 of 3 objects. Follows 3 step commands.   Attention: normal. Concentration: normal.   Level of consciousness: alert  Knowledge: good and consistent with education.   Able to name object. Able to read. Able to repeat. Able to write. Normal comprehension.     Cranial Nerves     CN II   Visual fields full to confrontation.   Visual acuity: normal  Right visual field deficit: none  Left visual field deficit: none     CN III, IV, VI   Right pupil: Shape: regular. Reactivity: brisk. Consensual response: intact.   Left pupil: Shape: regular. Reactivity: brisk. Consensual response: intact.   Nystagmus: none   Diplopia: none  Ophthalmoparesis: none  Upgaze: normal  Downgaze: normal  Conjugate gaze: present  Vestibulo-ocular reflex: present    CN V   Facial sensation intact.   Right corneal reflex: normal  Left corneal reflex: normal    CN VII   Right facial weakness: none  Left facial weakness: none    CN VIII   Hearing: intact    CN IX, X   Palate: symmetric  Right gag reflex: normal  Left gag reflex: normal    CN XI   Right sternocleidomastoid strength: normal  Left sternocleidomastoid strength: normal    CN XII   Tongue: not atrophic  Fasciculations: absent  Tongue deviation: none    Motor Exam   Muscle bulk: normal  Overall muscle tone: normal  Right arm tone: normal  Left arm tone: normal  Right leg tone: normal  Left leg tone: " normal    Sensory Exam   Light touch normal.   Vibration normal.   Proprioception normal.   Pinprick normal.     Gait, Coordination, and Reflexes     Tremor   Resting tremor: absent  Intention tremor: absent  Action tremor: absent    Reflexes   Reflexes 2+ except as noted.       Physical Exam   Constitutional: She appears well-developed and well-nourished.   Nursing note and vitals reviewed.      Hospital Outpatient Visit on 04/07/2014   Component Date Value Ref Range Status   • Glucose 04/07/2014 96  75 - 125 mg/dL Final     CBC,CMP 10/3/17 - [facility]      Assessment/Plan     Problem List Items Addressed This Visit        Other    Memory loss - Primary    Current Assessment & Plan     MRI Brain and labs are unremarkable    Exam show mild cognitive changes which should be remedied with SLP                      No Follow-up on file.

## 2017-12-21 NOTE — ASSESSMENT & PLAN NOTE
MRI Brain and labs are unremarkable    Exam show mild cognitive changes which should be remedied with SLP

## 2018-05-15 ENCOUNTER — TELEPHONE (OUTPATIENT)
Dept: ONCOLOGY | Facility: CLINIC | Age: 48
End: 2018-05-15

## 2018-05-15 DIAGNOSIS — C50.912 RECURRENT CANCER OF LEFT BREAST (HCC): Primary | ICD-10-CM

## 2018-05-15 NOTE — TELEPHONE ENCOUNTER
----- Message from Martina Gaston sent at 5/15/2018  8:17 AM EDT -----  Regarding: [facility]- LABS  Contact: [phone]  Patient needs to get labs drawn, but the ones in there are  she will get them drawn at her work CBC w/diff, and a CMP please fax these to [phone].

## 2018-05-17 ENCOUNTER — OFFICE VISIT (OUTPATIENT)
Dept: ONCOLOGY | Facility: CLINIC | Age: 48
End: 2018-05-17

## 2018-05-17 VITALS
SYSTOLIC BLOOD PRESSURE: 116 MMHG | TEMPERATURE: 98.1 F | WEIGHT: 180 LBS | HEIGHT: 63 IN | HEART RATE: 71 BPM | BODY MASS INDEX: 31.89 KG/M2 | DIASTOLIC BLOOD PRESSURE: 65 MMHG | RESPIRATION RATE: 16 BRPM

## 2018-05-17 DIAGNOSIS — C50.912 RECURRENT CANCER OF LEFT BREAST (HCC): Primary | ICD-10-CM

## 2018-05-17 PROCEDURE — 99214 OFFICE O/P EST MOD 30 MIN: CPT | Performed by: INTERNAL MEDICINE

## 2018-05-17 RX ORDER — OMEPRAZOLE 20 MG/1
20 CAPSULE, DELAYED RELEASE ORAL AS NEEDED
COMMUNITY

## 2018-05-17 NOTE — PROGRESS NOTES
Chief Complaint   Follow-up axillary recurrent left breast cancer-original left breast cancer diagnosed April 2011, followed by bilateral mastectomies with permanent prostheses, hormone positive and HER-2 negative with 8 negative lymph nodes at that time.  Status post adjuvant Taxotere and Cytoxan chemotherapy.  Attempts at adjuvant hormonal manipulation thwarted by intolerance-discontinued  Subsequent left axillary lymph node recurrence November 2013 with 3 of 4 axillary lymph nodes involved by tumor, status post surgical resection followed by systemic chemotherapy with FEC chemotherapy ×5 cycles and several attempts at hormonal manipulation-not tolerated by patient.    PROBLEM LIST   Patient Active Problem List   Diagnosis   • Recurrent cancer of left breast   • Chronic anxiety   • Lymphedema of left upper extremity   • Memory loss       HISTORY OF PRESENT ILLNESS:   Getting close to 5 years from time of axillary recurrence on the left.  Feels well.  She is doing some speech therapy to improve her memory.  She had noted some issues and I had her see Dr. Ndiaye who felt that she did have very mild cognitive dysfunction and so he recommended speech therapy.  The patient has gone from 70% comprehension and memory retention to 80% she tells me.  She is happy with results.    She continues to work full-time.  She has mild stable lymphedema of the left upper extremity.  She is elevating her arm but she hates to use any kind of stocking because they are so tight and restricting.    Past Medical History, Past Surgical History, Social History, Family History have been reviewed and are without significant changes except as mentioned.      Medications:      Current Outpatient Prescriptions:   •  escitalopram (LEXAPRO) 10 MG tablet, Take 0.25 tablets by mouth As Needed., Disp: , Rfl: 5  •  omeprazole (priLOSEC) 20 MG capsule, Take 20 mg by mouth As Needed., Disp: , Rfl:   •  acetaminophen (TYLENOL) 500 MG tablet, Take 500 mg  "by mouth Every 6 (Six) Hours As Needed for Mild Pain ., Disp: , Rfl:   •  loratadine (CLARITIN) 10 MG tablet, Take 10 mg by mouth Daily., Disp: , Rfl:   •  meclizine (ANTIVERT) 12.5 MG tablet, Take 12.5 mg by mouth 3 (Three) Times a Day As Needed for dizziness., Disp: , Rfl:     ALLERGIES:    Allergies   Allergen Reactions   • Ciprofloxacin    • Codeine    • Erythromycin    • Keflex [Cephalexin]    • Morphine And Related    • Penicillins    • Percocet [Oxycodone-Acetaminophen] Nausea And Vomiting   • Protonix [Pantoprazole Sodium] Other (See Comments)     nervous   • Sulfa Antibiotics    • Fluticasone Anxiety and Tinnitus       ROS:  Review of Systems   Constitutional: Negative for activity change and appetite change.        In general her health has been stable and it has been good.   HENT: Negative for mouth sores, sinus pressure and voice change.    Eyes: Negative for visual disturbance.   Respiratory: Negative for shortness of breath.    Cardiovascular: Negative for chest pain.   Gastrointestinal: Negative for abdominal pain and vomiting.   Genitourinary: Negative for dysuria.   Musculoskeletal: Negative for arthralgias and myalgias.        Mild left upper extremity lymphedema issues mentioned-stable   Skin: Negative for color change.   Neurological: Negative for dizziness, syncope and headaches.        Memory improved with speech therapy   Hematological: Negative for adenopathy.   Psychiatric/Behavioral: Negative for confusion, sleep disturbance and suicidal ideas. The patient is not nervous/anxious.            Objective:    /65   Pulse 71   Temp 98.1 °F (36.7 °C)   Resp 16   Ht 160 cm (63\")   Wt 81.6 kg (180 lb)   BMI 31.89 kg/m²     Physical Exam   Constitutional: She is oriented to person, place, and time. She appears well-developed and well-nourished. No distress.   The patient appears healthy relaxed and comfortable.  She is in no distress at all   HENT:   Head: Normocephalic.   Mouth/Throat: " Oropharynx is clear and moist.   Eyes: Conjunctivae and EOM are normal. No scleral icterus.   Neck: Normal range of motion. Neck supple. No thyromegaly present.   Cardiovascular: Normal rate, regular rhythm and normal heart sounds.    No murmur heard.  Pulmonary/Chest: Effort normal and breath sounds normal. She has no wheezes. She has no rales.   Bilateral breast implants in place with good cosmesis area no worrisome mass palpable and no masses appreciable in either axilla she has very mild left upper extremity lymphedema   Abdominal: Soft. Bowel sounds are normal. She exhibits no distension and no mass. There is no tenderness.   Musculoskeletal: She exhibits no edema or tenderness.   Lymphadenopathy:     She has no cervical adenopathy.   Neurological: She is alert and oriented to person, place, and time. She displays normal reflexes. No cranial nerve deficit.   Skin: Skin is warm and dry. No rash noted.   Psychiatric: She has a normal mood and affect. Judgment normal.   Vitals reviewed.             RECENT LABS:  Hematology WBC   Date Value Ref Range Status   03/10/2014 4.90 3.50 - 10.80 K/mcL Final     Hemoglobin   Date Value Ref Range Status   03/10/2014 11.8 11.5 - 15.5 g/dL Final     Hematocrit   Date Value Ref Range Status   03/10/2014 34.9 34.5 - 44.0 % Final     MCV   Date Value Ref Range Status   03/10/2014 95.8 80.0 - 99.0 fL Final     RDW   Date Value Ref Range Status   03/10/2014 15.5 (H) 11.3 - 14.5 % Final     MPV   Date Value Ref Range Status   03/10/2014 7.1 fL Final     Platelets   Date Value Ref Range Status   03/10/2014 320 150 - 450 K/mcL Final     Neutrophils Absolute   Date Value Ref Range Status   03/10/2014 2.70 1.50 - 8.30 K/mcL Final     Lymphocytes Absolute   Date Value Ref Range Status   03/10/2014 1.60 0.60 - 4.80 K/mcL Final     Monocytes Absolute   Date Value Ref Range Status   03/10/2014 0.60 0.00 - 1.00 K/mcL Final       Glucose   Date Value Ref Range Status   03/10/2014 108 (H) 70 -  100 mg/dL Final     Sodium   Date Value Ref Range Status   03/10/2014 140 136 - 145 mmol/L Final     Potassium   Date Value Ref Range Status   03/10/2014 4.2 3.4 - 5.4 mmol/L Final     CO2   Date Value Ref Range Status   03/10/2014 29 20 - 31 mmol/L Final     Chloride   Date Value Ref Range Status   03/10/2014 105 98 - 107 mmol/L Final     Anion Gap   Date Value Ref Range Status   03/10/2014 6 3 - 11 mmol/L Final     Creatinine   Date Value Ref Range Status   03/10/2014 0.7 0.6 - 1.3 mg/dL Final     BUN   Date Value Ref Range Status   03/10/2014 15 6 - 20 mg/dL Final     Calcium   Date Value Ref Range Status   03/10/2014 8.7 8.7 - 10.4 mg/dL Final     Alkaline Phosphatase   Date Value Ref Range Status   03/10/2014 128 (H) 25 - 100 Units/L Final     Total Protein   Date Value Ref Range Status   03/10/2014 6.6 6.4 - 8.3 g/dL Final     ALT (SGPT)   Date Value Ref Range Status   03/10/2014 41 (H) 7 - 40 Units/L Final     AST (SGOT)   Date Value Ref Range Status   03/10/2014 30 8 - 33 Units/L Final     Total Bilirubin   Date Value Ref Range Status   03/10/2014 0.4 0.3 - 1.2 mg/dL Final     Albumin   Date Value Ref Range Status   03/10/2014 4.3 3.4 - 4.8 g/dL Final          Perf Status: 0    Assessment/Plan    Diagnoses and all orders for this visit:    Recurrent cancer of left breast      The patient is doing well.  She has no evidence of recurrence.  In addition her memory is improving with speech therapy.    Regarding her left upper extremity lymphedema I suggested getting a sleeve from a sporting [employer] store or online.  I suggested that she put it on when she gets home from work and that where it until the next morning.  Perhaps this is not felt to be state-of-the-art care but it certainly is better than what she is doing at the present and I think she'll tolerate it much much much better.  All the above discussed with her.      Chaz Lamb MD , 5/17/2018    CC:

## 2018-06-21 ENCOUNTER — OFFICE VISIT (OUTPATIENT)
Dept: CARDIOLOGY | Facility: CLINIC | Age: 48
End: 2018-06-21

## 2018-06-21 VITALS
WEIGHT: 181 LBS | SYSTOLIC BLOOD PRESSURE: 108 MMHG | BODY MASS INDEX: 32.07 KG/M2 | HEIGHT: 63 IN | DIASTOLIC BLOOD PRESSURE: 78 MMHG | HEART RATE: 72 BPM

## 2018-06-21 DIAGNOSIS — R06.00 PND (PAROXYSMAL NOCTURNAL DYSPNEA): ICD-10-CM

## 2018-06-21 DIAGNOSIS — R06.02 SHORTNESS OF BREATH: ICD-10-CM

## 2018-06-21 DIAGNOSIS — Z82.49 FAMILY HISTORY OF HEART DISEASE IN FEMALE FAMILY MEMBER BEFORE AGE 65: ICD-10-CM

## 2018-06-21 DIAGNOSIS — R00.2 PALPITATIONS: Primary | ICD-10-CM

## 2018-06-21 DIAGNOSIS — Z85.3 HISTORY OF BREAST CANCER: ICD-10-CM

## 2018-06-21 DIAGNOSIS — E78.00 HYPERCHOLESTEREMIA: ICD-10-CM

## 2018-06-21 DIAGNOSIS — F41.9 ANXIETY: ICD-10-CM

## 2018-06-21 DIAGNOSIS — I34.1 MVP (MITRAL VALVE PROLAPSE): ICD-10-CM

## 2018-06-21 PROCEDURE — 99214 OFFICE O/P EST MOD 30 MIN: CPT | Performed by: NURSE PRACTITIONER

## 2018-06-21 NOTE — PROGRESS NOTES
Chief Complaint   Patient presents with   • Follow-up     Was in ER on 06/06 for chest discomfort, SOA, and numbness and tingling down arms. Workup in ER negative- will obtain records. Has had some palpitations since but no chest discomfort. PCP refills meds. Labs per ER.     • ASA     Does not take aspirin.        Subjective       Asuncion Flynn is a 48 y.o. female seen in January 2017 for initial cardiac evaluation. She had been to ER with chest pain and workup was negative.  She has no previously diagnosed hypertension and diabetes.  She does have history of hypercholesterolemia and breast cancer, for which she has undergone surgery and chemotherapy.   At consultation in 2017, her LDL was 194 and lipitor recommended. A stress test and echo were done and no ischemia was noted. Her LV function was normal. She had not been seen in the office since that time.   Today she returns for evaluation of recent development of chest pain for which she went to ER on 6/6/18. Troponin was normal. CXR did not show acute changes EKG showed NSR. She is also concerned about waking some nights with dyspnea and heart pounding. She also has random episodes of heart palpitations in form of heart pounding. This happens several times a week.      HPI     Cardiac History:    Past Surgical History:   Procedure Laterality Date   • CARDIOVASCULAR STRESS TEST  02/02/2017    [facility]- 6 min 31 sec, 93% THR, 162/70, negative for ischemia   • ECHO - CONVERTED  12/04/2013    @[facility]. . EF 65%.   • ECHO - CONVERTED  02/02/2017    EF 60%, normal diastolic function, mild MR   • MASTECTOMY Bilateral 05/2011       Current Outpatient Prescriptions   Medication Sig Dispense Refill   • acetaminophen (TYLENOL) 500 MG tablet Take 500 mg by mouth Every 6 (Six) Hours As Needed for Mild Pain .     • loratadine (CLARITIN) 10 MG tablet Take 10 mg by mouth Daily.     • meclizine (ANTIVERT) 12.5 MG tablet Take 12.5 mg by mouth 3 (Three) Times a Day As Needed  for dizziness.     • omeprazole (priLOSEC) 20 MG capsule Take 20 mg by mouth As Needed.       No current facility-administered medications for this visit.        Ciprofloxacin; Codeine; Erythromycin; Keflex [cephalexin]; Morphine and related; Penicillins; Percocet [oxycodone-acetaminophen]; Protonix [pantoprazole sodium]; Sulfa antibiotics; and Fluticasone    Past Medical History:   Diagnosis Date   • Anxiety associated with depression    • Cancer     hx of breast cancer x2   • Depression    • H/O mastectomy    • Hearing difficulty     Bilaterally. Recurrent ear infections as well as allergies and fluid in her ears.Not progressive.    • Hyperlipidemia    • Hypothyroidism    • Multiple allergies    • Panic disorder    • Twitching     4/7/2016: Little bit of twitching in the left upper extremity that began about 3 months or so ago, again it is not progressive in the slightest but it is noticeable.Left upper extremity movement disorder, non-progressive, could be related to anxiety.    • Vitamin D deficiency        Social History     Social History   • Marital status:      Spouse name: N/A   • Number of children: N/A   • Years of education: N/A     Occupational History   • Not on file.     Social History Main Topics   • Smoking status: Former Smoker     Types: Cigarettes     Quit date: 8/21/2005   • Smokeless tobacco: Never Used   • Alcohol use No   • Drug use: No   • Sexual activity: Defer     Other Topics Concern   • Not on file     Social History Narrative   • No narrative on file       Family History   Problem Relation Age of Onset   • Hypertension Mother    • Depression Mother    • Mental illness Mother    • Cancer Father    • Heart disease Father    • Heart disease Maternal Grandmother    • Stroke Maternal Grandmother    • Dementia Maternal Grandmother    • Cancer Maternal Grandfather    • Multiple sclerosis Maternal Grandfather    • Cancer Paternal Grandmother    • Diabetes Paternal Grandmother    • Kidney  "disease Paternal Grandmother    • Heart attack Paternal Grandfather    • Mental illness Maternal Aunt    • Parkinsonism Maternal Aunt    • Diabetes Maternal Aunt    • Multiple sclerosis Maternal Uncle    • Multiple sclerosis Paternal Aunt        Review of Systems   Constitution: Positive for malaise/fatigue. Negative for chills, decreased appetite, diaphoresis and fever.   HENT: Negative for congestion, hoarse voice and nosebleeds.    Cardiovascular: Positive for chest pain, leg swelling, palpitations (\"pounding\") and paroxysmal nocturnal dyspnea. Negative for near-syncope.   Respiratory: Positive for shortness of breath (some days feel short of breath). Negative for cough.    Endocrine: Negative for polydipsia, polyphagia and polyuria.   Hematologic/Lymphatic: Negative for bleeding problem. Does not bruise/bleed easily.   Skin: Negative for dry skin and itching.   Musculoskeletal: Negative for joint pain and muscle weakness.   Gastrointestinal: Negative for abdominal pain, melena and nausea.   Genitourinary: Negative for dysuria and hematuria.   Neurological: Positive for dizziness (not a new symptoms - from my ears), headaches (the other day, but usually not often) and numbness (in arms and hands associated with chest pain when went to ER). Negative for light-headedness and tremors.   Psychiatric/Behavioral: The patient has insomnia and is nervous/anxious.         Objective     /78   Pulse 72   Ht 160 cm (63\")   Wt 82.1 kg (181 lb)   BMI 32.06 kg/m²     Physical Exam   Constitutional: She is oriented to person, place, and time. She appears well-nourished. She does not appear ill. No distress.   HENT:   Head: Normocephalic.   Eyes: Pupils are equal, round, and reactive to light.   Neck: Normal range of motion. Neck supple. No JVD present. Carotid bruit is not present.   Cardiovascular: Normal rate, regular rhythm, S1 normal, S2 normal and normal pulses.    Murmur heard.   Low-pitched systolic murmur is " present with a grade of 2/6   Pulmonary/Chest: Breath sounds normal. She has no wheezes. She has no rales.   Abdominal: Soft. Bowel sounds are normal. She exhibits no distension. There is no tenderness.   Musculoskeletal: Normal range of motion. She exhibits no edema or tenderness.   Neurological: She is alert and oriented to person, place, and time.   Skin: Skin is warm and dry.   Psychiatric: She has a normal mood and affect. Her speech is normal and behavior is normal. Thought content normal.        Procedures: none today      Assessment/Plan      Asuncion was seen today for follow-up and asa.    Diagnoses and all orders for this visit:    Palpitations  -     Holter Monitor - 72 Hour Up To 21 Days; Future  -     Overnight Sleep Oximetry Study; Future    PND (paroxysmal nocturnal dyspnea)  -     Holter Monitor - 72 Hour Up To 21 Days; Future  -     Overnight Sleep Oximetry Study; Future    Hypercholesteremia    MVP (mitral valve prolapse)    Anxiety    Shortness of breath  -     Holter Monitor - 72 Hour Up To 21 Days; Future  -     Overnight Sleep Oximetry Study; Future    Family history of heart disease in female family member before age 65  -     Holter Monitor - 72 Hour Up To 21 Days; Future  -     Overnight Sleep Oximetry Study; Future    History of breast cancer        Patient's Body mass index is 32.06 kg/m². BMI is above normal parameters. Recommendations include: nutrition counseling. Diet for weight loss encouraged.     Diet for cholesterol management information also given to her. She was afraid to try statin therapy in the past. She will follow with you for management of labs including lipid panel. I also encouraged avoiding caffeine to prevent palpitations.     She reports PND associated with heart pounding. An overnight oxygen monitor ordered to evaluate for hypoxia.     She has episodes of palpitations several times a week. These make her feel weak and short of breath. A cardiac monitor ordered to  evaluate for any arrhythmia.    Soft systolic cardiac murmur noted. At this time echocardiogram not ordered given the report last year was stable. We will consider repeating if symptoms or problems persist.            Electronically signed by BENJIE Wallace,  June 21, 2018 5:48 PM

## 2018-06-21 NOTE — PATIENT INSTRUCTIONS
Cholesterol  Cholesterol is a white, waxy, fat-like substance that is needed by the human body in small amounts. The liver makes all the cholesterol we need. Cholesterol is carried from the liver by the blood through the blood vessels. Deposits of cholesterol (plaques) may build up on blood vessel (artery) walls. Plaques make the arteries narrower and stiffer. Cholesterol plaques increase the risk for heart attack and stroke.  You cannot feel your cholesterol level even if it is very high. The only way to know that it is high is to have a blood test. Once you know your cholesterol levels, you should keep a record of the test results. Work with your health care provider to keep your levels in the desired range.  What do the results mean?  · Total cholesterol is a rough measure of all the cholesterol in your blood.  · LDL (low-density lipoprotein) is the “bad” cholesterol. This is the type that causes plaque to build up on the artery walls. You want this level to be low.  · HDL (high-density lipoprotein) is the “good” cholesterol because it cleans the arteries and carries the LDL away. You want this level to be high.  · Triglycerides are fat that the body can either burn for energy or store. High levels are closely linked to heart disease.  What are the desired levels of cholesterol?  · Total cholesterol below 200.  · LDL below 100 for people who are at risk, below 70 for people at very high risk.  · HDL above 40 is good. A level of 60 or higher is considered to be protective against heart disease.  · Triglycerides below 150.  How can I lower my cholesterol?  Diet  Follow your diet program as told by your health care provider.  · Choose fish or white meat chicken and turkey, roasted or baked. Limit fatty cuts of red meat, fried foods, and processed meats, such as sausage and lunch meats.  · Eat lots of fresh fruits and vegetables.  · Choose whole grains, beans, pasta, potatoes, and cereals.  · Choose olive oil, corn  oil, or canola oil, and use only small amounts.  · Avoid butter, mayonnaise, shortening, or palm kernel oils.  · Avoid foods with trans fats.  · Drink skim or nonfat milk and eat low-fat or nonfat yogurt and cheeses. Avoid whole milk, cream, ice cream, egg yolks, and full-fat cheeses.  · Healthier desserts include rich food cake, sayda snaps, animal crackers, hard candy, popsicles, and low-fat or nonfat frozen yogurt. Avoid pastries, cakes, pies, and cookies.    Exercise  · Follow your exercise program as told by your health care provider. A regular program:  ? Helps to decrease LDL and raise HDL.  ? Helps with weight control.  · Do things that increase your activity level, such as gardening, walking, and taking the stairs.  · Ask your health care provider about ways that you can be more active in your daily life.    Medicine  · Take over-the-counter and prescription medicines only as told by your health care provider.  ? Medicine may be prescribed by your health care provider to help lower cholesterol and decrease the risk for heart disease. This is usually done if diet and exercise have failed to bring down cholesterol levels.  ? If you have several risk factors, you may need medicine even if your levels are normal.    This information is not intended to replace advice given to you by your health care provider. Make sure you discuss any questions you have with your health care provider.  Document Released: 09/12/2002 Document Revised: 07/15/2017 Document Reviewed: 06/17/2017  [employer] Interactive Patient Education © 2018 [employer]

## 2018-08-10 ENCOUNTER — TELEPHONE (OUTPATIENT)
Dept: ONCOLOGY | Facility: CLINIC | Age: 48
End: 2018-08-10

## 2018-08-10 NOTE — TELEPHONE ENCOUNTER
Patient requested letter for disability purposes.    Letter to be mailed out Monday or Tuesday of next week.

## 2018-08-10 NOTE — TELEPHONE ENCOUNTER
----- Message from Shelly Allen MA sent at 8/7/2018  4:12 PM EDT -----  Regarding: FW: [facility] - LETTER  Contact: [phone]      ----- Message -----  From: Bia Sharif  Sent: 8/7/2018  11:55 AM  To: e [facility]  Subject: [facility] - LETTER                                 PATIENT NEEDS LETTER BEFORE DR LAWTON RETIRES ABOUT THE SIDE EFFECTS OF CHEMO WITH MEMORY LOSS AND CONCENTRATION.  PLEASE CALL HER BACK.

## 2018-10-22 NOTE — PROGRESS NOTES
Chief Complaint   Patient presents with   • Establish Care     Patient states she went to ER at [facility] due to severe jaw pain with nausea. She has been feeling very tired.   • Palpitations     Having alot of palpitations and pounding heart beats. She has had some lightheadedness.       CARDIAC COMPLAINTS  chest pressure/discomfort and palpitations        Subjective   Asuncion Flynn is a 47 y.o. female came in today for her initial cardiac evaluation.  She has no previously diagnosed hypertension and diabetes.  She does have history of hypercholesterolemia, was been treated with diet.  She also has history of breast cancer, for which she has undergone surgery and chemotherapy.  She has been to the emergency room secondary to significant jaw pain radiating down into the upper part of the chest.  It lasted for few hours.  They kept her in the ER and apparently told it was not an MI and was sent home.  The symptoms subsided by the next day, and since then she had few episodes of such discomfort but they but not as significant and that long lasting.  Her lab work which was done recently showed that the LDL has gone up to 194.  Her liver functions were within normal limit.  She is also under a lot of stress recently.              Cardiac History  Past Surgical History   Procedure Laterality Date   • Mastectomy Bilateral 05/2011   • Echo - converted  12/04/2013     @[facility]. . EF 65%.       Current Outpatient Prescriptions   Medication Sig Dispense Refill   • escitalopram (LEXAPRO) 10 MG tablet Take 0.25 tablets by mouth Daily.  5   • loratadine (CLARITIN) 10 MG tablet Take 10 mg by mouth Daily.     • omeprazole (PriLOSEC) 40 MG capsule Take 1 capsule by mouth Daily.  2   • atorvastatin (LIPITOR) 20 MG tablet Take 1 tablet by mouth Daily. 90 tablet 3     No current facility-administered medications for this visit.        Allergies:  Ciprofloxacin; Codeine; Erythromycin; Keflex [cephalexin]; Morphine and related;  Penicillins; and Sulfa antibiotics      Past Medical History   Diagnosis Date   • Anxiety associated with depression    • Cancer      hx of breast cancer x2   • H/O mastectomy    • Hearing difficulty      Bilaterally. Recurrent ear infections as well as allergies and fluid in her ears.Not progressive.    • Hyperlipidemia    • Hypothyroidism    • Multiple allergies    • Panic disorder    • Twitching      4/7/2016: Little bit of twitching in the left upper extremity that began about 3 months or so ago, again it is not progressive in the slightest but it is noticeable.Left upper extremity movement disorder, non-progressive, could be related to anxiety.    • Vitamin D deficiency        Social History     Social History   • Marital status:      Spouse name: N/A   • Number of children: N/A   • Years of education: N/A     Occupational History   • Not on file.     Social History Main Topics   • Smoking status: Former Smoker     Types: Cigarettes     Quit date: 8/21/2005   • Smokeless tobacco: Never Used   • Alcohol use No   • Drug use: No   • Sexual activity: Not on file     Other Topics Concern   • Not on file     Social History Narrative       Family History   Problem Relation Age of Onset   • Hypertension Mother    • Depression Mother    • Cancer Father    • Heart disease Father    • Heart disease Maternal Grandmother    • Cancer Maternal Grandfather    • Cancer Paternal Grandmother    • Diabetes Paternal Grandmother    • Kidney disease Paternal Grandmother    • Heart attack Paternal Grandfather        Review of Systems   Constitutional: Positive for fatigue. Negative for activity change.   HENT: Negative for congestion and trouble swallowing.    Eyes: Negative for discharge.   Respiratory: Positive for chest tightness and shortness of breath.    Cardiovascular: Positive for chest pain and palpitations.   Gastrointestinal: Negative for abdominal distention.   Endocrine: Negative for cold intolerance.  "  Genitourinary: Negative for difficulty urinating.   Musculoskeletal: Positive for arthralgias and myalgias.   Allergic/Immunologic: Negative for environmental allergies.   Neurological: Negative for dizziness.   Hematological: Negative for adenopathy.   Psychiatric/Behavioral: Negative for agitation.       Diabetes- No  Thyroid- normal    Objective     Visit Vitals   • /70 (BP Location: Right arm)   • Pulse 76   • Ht 63\" (160 cm)   • Wt 188 lb (85.3 kg)   • BMI 33.3 kg/m2       Physical Exam   Constitutional: She appears well-developed.   HENT:   Head: Normocephalic.   Eyes: Pupils are equal, round, and reactive to light.   Neck: Normal range of motion.   Cardiovascular: Normal rate.    Murmur heard.  Pulmonary/Chest: Effort normal.   Abdominal: Soft.   Musculoskeletal: Normal range of motion.   Neurological: She is alert.   Skin: Skin is warm.   Psychiatric: She has a normal mood and affect.         ECG 12 Lead  Date/Time: 1/24/2017 12:43 PM  Performed by: KEYSHA MARISCAL  Authorized by: KEYSHA MARISCAL   Previous ECG: no previous ECG available  Rhythm: sinus rhythm  Rate: normal  QRS axis: normal  Clinical impression: non-specific ECG                  Assessment/Plan   At baseline her heart rate and blood pressure appears stable.  Her EKG showed sinus rhythm, small QRS complex and nonspecific ST-T changes.  Her clinical examination is unremarkable.  Had a long talk with her about her symptoms and also about her labs.  Since the LDL has been going up, I think she needs to be on a statin.  Lipitor 20 mg once a day was started.  Some of her symptoms is concerning.  I scheduled her to undergo a stress test to evaluate her functional status, chronotropic response and also to rule out ischemia.  She also need an echocardiogram to evaluate the LV function and also to evaluate for any pericardial effusion.  Based on the results, further recommendations will be made..  Asuncion was seen today for establish " care and palpitations.    Diagnoses and all orders for this visit:    Chest pressure  -     Stress Test With Myocardial Perfusion One Day; Future  -     Stress Test With Myocardial Perfusion One Day    Jaw pain  -     Stress Test With Myocardial Perfusion One Day; Future  -     Stress Test With Myocardial Perfusion One Day    Dizziness  -     Stress Test With Myocardial Perfusion One Day; Future  -     Adult Transthoracic Echo Complete; Future  -     Stress Test With Myocardial Perfusion One Day  -     Adult Transthoracic Echo Complete    Hypercholesteremia    Metabolic syndrome  -     Adult Transthoracic Echo Complete; Future  -     Adult Transthoracic Echo Complete    Recurrent cancer of left breast  -     CBC & Differential  -     Comprehensive Metabolic Panel    Other orders  -     atorvastatin (LIPITOR) 20 MG tablet; Take 1 tablet by mouth Daily.                         Electronically signed by Silas Davis MD January 24, 2017 12:38 PM       fs coverage.

## 2018-11-14 ENCOUNTER — OFFICE VISIT (OUTPATIENT)
Dept: ONCOLOGY | Facility: CLINIC | Age: 48
End: 2018-11-14

## 2018-11-14 VITALS
SYSTOLIC BLOOD PRESSURE: 110 MMHG | TEMPERATURE: 98.1 F | HEART RATE: 88 BPM | DIASTOLIC BLOOD PRESSURE: 69 MMHG | OXYGEN SATURATION: 97 % | RESPIRATION RATE: 16 BRPM

## 2018-11-14 DIAGNOSIS — C50.912 RECURRENT CANCER OF LEFT BREAST (HCC): Primary | ICD-10-CM

## 2018-11-14 PROCEDURE — 99214 OFFICE O/P EST MOD 30 MIN: CPT | Performed by: INTERNAL MEDICINE

## 2018-11-14 NOTE — PROGRESS NOTES
PROBLEM LIST:  1. Stage II Left breast cancer, ER+ HI+ Her2 negative.  A) bilateral mastectomy 5/2011.  Pathology showed 8 negative lymph nodes.  Adjuvant chemotherapy with TC.  Intolerant of tamoxifen, femara, and aromasin.  B) recurrence in left axillary lymph nodes - 3 or 4 LN involved, surgically removed 11/2014.  Treated with FEC x 5 cycles.  2. Anxiety  3. Cognitive impairment    Subjective     HISTORY OF PRESENT ILLNESS:   Asuncion Flynn returns for follow-up.   She is transitioning care from Dr. Lamb.  She has a history of a stage II ER positive breast cancer treated with bilateral mastectomy in May 2011 and adjuvant Taxotere and Cytoxan.  She did not tolerate adjuvant hormonal therapy.  She had a lymph node recurrence 3 years later and again received adjuvant chemotherapy.  Since then she has had difficulty with memory which has not improved much despite speech therapy.  She is actually going to be starting a new role at her job in about 3 weeks which will be more repetitive and she hopes this will remove a lot of stress.  In the past she has taken Lexapro for anxiety that she has to cut it into half or fourths because it seems to interfere with sleep.    Past Medical History, Past Surgical History, Social History, Family History have been reviewed and are without significant changes except as mentioned.    Review of Systems   A comprehensive 14 point review of systems was performed and was negative except as mentioned.    Medications:  The current medication list was reviewed in the EMR    ALLERGIES:    Allergies   Allergen Reactions   • Ciprofloxacin    • Codeine    • Erythromycin    • Keflex [Cephalexin]    • Morphine And Related    • Penicillins    • Percocet [Oxycodone-Acetaminophen] Nausea And Vomiting   • Protonix [Pantoprazole Sodium] Other (See Comments)     nervous   • Sulfa Antibiotics    • Fluticasone Anxiety and Tinnitus       Objective      /69   Pulse 88   Temp 98.1 °F (36.7  °C) (Temporal)   Resp 16   SpO2 97%      Performance Status: 0    General: well appearing female in no acute distress  Neuro: alert and oriented  HEENT: sclera anicteric, oropharynx clear  Lymphatics: no cervical, supraclavicular, or axillary adenopathy  Cardiovascular: regular rate and rhythm, no murmurs  Chest: Status post bilateral mastectomy with implant reconstruction.  No palpable masses  Lungs: clear to auscultation bilaterally  Abdomen: soft, nontender, nondistended.  No palpable organomegaly  Extremeties: no lower extremity edema  Skin: no rashes, lesions, bruising, or petechiae  Psych: mood and affect appropriate          Assessment/Plan   Asuncion Flynn is a 48 y.o. year old female with a history of early stage ER positive HER-2 negative breast cancer who had a local recurrence now about 4 years ago.  She was treated with adjuvant chemotherapy and has not tolerated adjuvant hormonal therapy due to psychiatric disturbance.  She returns for follow-up today she does not have any evidence of recurrent cancer.  She continues to struggle with memory and cognitive issues.  I recommended that she be evaluated by psychiatry, as it seems there is a component of anxiety and depression, and possibly ADHD that is contributing to her symptoms.  For now she would like to just go back on the Lexapro and she is hopeful that her job change will help to relieve some of her stress and anxiety.  She does have a family history of ADHD and I wonder if she could benefit from treatment of this.  Otherwise I will see her back in 6 months.                  Visit time was 25 minutes, greater than 50% spent in counseling      Arcelia Rubi MD  [facility]    11/14/2018          CC:

## 2019-03-25 ENCOUNTER — OUTSIDE FACILITY SERVICE (OUTPATIENT)
Dept: CARDIOLOGY | Facility: CLINIC | Age: 49
End: 2019-03-25

## 2019-03-25 PROCEDURE — 93306 TTE W/DOPPLER COMPLETE: CPT | Performed by: INTERNAL MEDICINE

## 2019-04-02 ENCOUNTER — OFFICE VISIT (OUTPATIENT)
Dept: NEUROLOGY | Facility: CLINIC | Age: 49
End: 2019-04-02

## 2019-04-02 VITALS
BODY MASS INDEX: 29.77 KG/M2 | HEIGHT: 63 IN | SYSTOLIC BLOOD PRESSURE: 100 MMHG | OXYGEN SATURATION: 97 % | DIASTOLIC BLOOD PRESSURE: 68 MMHG | RESPIRATION RATE: 18 BRPM | WEIGHT: 168 LBS | HEART RATE: 88 BPM

## 2019-04-02 DIAGNOSIS — G43.C0 PERIODIC HEADACHE SYNDROME, NOT INTRACTABLE: Primary | ICD-10-CM

## 2019-04-02 PROCEDURE — 99214 OFFICE O/P EST MOD 30 MIN: CPT | Performed by: PSYCHIATRY & NEUROLOGY

## 2019-04-02 RX ORDER — ESCITALOPRAM OXALATE 10 MG/1
5 TABLET ORAL EVERY OTHER DAY
COMMUNITY

## 2019-04-02 RX ORDER — DIVALPROEX SODIUM 500 MG/1
500 TABLET, EXTENDED RELEASE ORAL DAILY
Qty: 30 TABLET | Refills: 5 | Status: SHIPPED | OUTPATIENT
Start: 2019-04-02 | End: 2019-04-25 | Stop reason: ALTCHOICE

## 2019-04-02 NOTE — ASSESSMENT & PLAN NOTE
Headaches are newly identified.  Medication changes per orders.     Likely complex migraines    Start Depakote  mg qhs

## 2019-04-02 NOTE — PROGRESS NOTES
Subjective   Patient ID: Asuncion Flynn is a 49 y.o. female     Chief Complaint   Patient presents with   • Memory Loss      HA    History of Present Illness    49 y.o. woman returns in follow up for memory issues and a new problem of bilateral LE weakness, paresthesias, bilateral UE weakness.     Sx of slurred speech and weakness.  Sx occur daily.  Comes on after having a HA.  HA located over entire head.  Sharp pain.  Lasts for hours.  Moderate intensity.  HA are 5 days a week.  Sx present for March 23, 2019.      Reviewed medical records:    Pt admitted to [facility] 3/23/19 - 3/24/19 for all four weakness numbness+/- weakness and near syncope when standing in a shopping line.  Sx improved with sitting and recurred when standing.      Labs - CMP, CBC, coags unremarkable  EKG - NSR       CT/CTA head and neck, my review of films, normal    MRI Brain, my review of films, 3/25/19 normal    Not felt to be a TIA/CVA.      Last visit on 12/21/17 continued SLP.      Labs - TSH, B12, CBC,CMP - [facility]  MRI Brain, normal    Scheduled SLP appt but has not yet started.  Continued to have decreased att/concentration and shifting topics.  No other significant changes.     Problem history:    Notes after first round of chemo in 2012 noticed mild memory issues.   killed July 3, 2015.  After event trouble with concentration and focus.  Notices at work she is getting behind.  Changed duties at work to only focus on one task at a time.  Memory problems increase her anxiety.  Trouble shifting topics and maintaining focus.      Intermittent twitching of left face, tongue and arm. Lasts for 1 -2 minutes.  Frequency is once a week.  Relaxing or elevating arm improves tremor.         Reviewed Dr Lamb's notes:    H/O left breast cancer April 2011 followed by bilateral mastectomies with permanent protheses, isolated left axillary recurrence 10/17/2013,    S/P 5 cycles of FEC chemo.    Pt reports decreased ST memory, facial twitching and  left arm shaking.  Pt is concerned she is going to lose her job due to memory issues.       Past Medical History:   Diagnosis Date   • Anxiety associated with depression    • Cancer (CMS/HCC)     hx of breast cancer x2   • Depression    • H/O mastectomy    • Hearing difficulty     Bilaterally. Recurrent ear infections as well as allergies and fluid in her ears.Not progressive.    • Hyperlipidemia    • Hypothyroidism    • Multiple allergies    • Panic disorder    • Twitching     2016: Little bit of twitching in the left upper extremity that began about 3 months or so ago, again it is not progressive in the slightest but it is noticeable.Left upper extremity movement disorder, non-progressive, could be related to anxiety.    • Vitamin D deficiency      Family History   Problem Relation Age of Onset   • Hypertension Mother    • Depression Mother    • Mental illness Mother    • Cancer Father    • Heart disease Father    • Heart disease Maternal Grandmother    • Stroke Maternal Grandmother    • Dementia Maternal Grandmother    • Cancer Maternal Grandfather    • Multiple sclerosis Maternal Grandfather    • Cancer Paternal Grandmother    • Diabetes Paternal Grandmother    • Kidney disease Paternal Grandmother    • Heart attack Paternal Grandfather    • Mental illness Maternal Aunt    • Parkinsonism Maternal Aunt    • Diabetes Maternal Aunt    • Multiple sclerosis Maternal Uncle    • Multiple sclerosis Paternal Aunt      Social History     Socioeconomic History   • Marital status:      Spouse name: Not on file   • Number of children: Not on file   • Years of education: Not on file   • Highest education level: Not on file   Tobacco Use   • Smoking status: Former Smoker     Types: Cigarettes     Last attempt to quit: 2005     Years since quittin.6   • Smokeless tobacco: Never Used   Substance and Sexual Activity   • Alcohol use: No   • Drug use: No   • Sexual activity: Defer       Review of Systems  "  Constitutional: Negative for activity change and unexpected weight change.   HENT: Negative for tinnitus and trouble swallowing.    Eyes: Negative for photophobia and visual disturbance.   Respiratory: Negative for apnea and choking.    Cardiovascular: Negative for leg swelling.   Endocrine: Negative for cold intolerance and heat intolerance.   Genitourinary: Negative for difficulty urinating, frequency, menstrual problem and urgency.   Musculoskeletal: Negative for back pain and gait problem.   Skin: Negative for color change.   Allergic/Immunologic: Negative for immunocompromised state.   Neurological: Positive for facial asymmetry. Negative for dizziness, tremors, seizures, syncope, speech difficulty and light-headedness.   Hematological: Negative for adenopathy. Does not bruise/bleed easily.   Psychiatric/Behavioral: Positive for decreased concentration and dysphoric mood. Negative for behavioral problems, confusion, hallucinations and sleep disturbance. The patient is nervous/anxious.        Objective     Vitals:    04/02/19 1030   BP: 100/68   BP Location: Right arm   Patient Position: Sitting   Cuff Size: Adult   Pulse: 88   Resp: 18   SpO2: 97%   Weight: 76.2 kg (168 lb)   Height: 160 cm (63\")       Neurologic Exam     Mental Status   Registration: recalls 3 of 3 objects. Recall at 5 minutes: recalls 3 of 3 objects. Follows 3 step commands.   Attention: normal. Concentration: normal.   Level of consciousness: alert  Knowledge: good and consistent with education.   Able to name object. Able to read. Able to repeat. Able to write. Normal comprehension.     Cranial Nerves     CN II   Visual fields full to confrontation.   Visual acuity: normal  Right visual field deficit: none  Left visual field deficit: none     CN III, IV, VI   Right pupil: Shape: regular. Reactivity: brisk. Consensual response: intact.   Left pupil: Shape: regular. Reactivity: brisk. Consensual response: intact.   Nystagmus: none "   Diplopia: none  Ophthalmoparesis: none  Upgaze: normal  Downgaze: normal  Conjugate gaze: present  Vestibulo-ocular reflex: present    CN V   Facial sensation intact.   Right corneal reflex: normal  Left corneal reflex: normal    CN VII   Right facial weakness: none  Left facial weakness: none    CN VIII   Hearing: intact    CN IX, X   Palate: symmetric  Right gag reflex: normal  Left gag reflex: normal    CN XI   Right sternocleidomastoid strength: normal  Left sternocleidomastoid strength: normal    CN XII   Tongue: not atrophic  Fasciculations: absent  Tongue deviation: none    Motor Exam   Muscle bulk: normal  Overall muscle tone: normal  Right arm tone: normal  Left arm tone: normal  Right leg tone: normal  Left leg tone: normal    Sensory Exam   Light touch normal.   Vibration normal.   Proprioception normal.   Pinprick normal.     Gait, Coordination, and Reflexes     Tremor   Resting tremor: absent  Intention tremor: absent  Action tremor: absent    Reflexes   Reflexes 2+ except as noted.       Physical Exam   Constitutional: She appears well-developed and well-nourished.   Nursing note and vitals reviewed.      Hospital Outpatient Visit on 04/07/2014   Component Date Value Ref Range Status   • Glucose 04/07/2014 96  75 - 125 mg/dL Final     CBC,CMP 10/3/17 - N[facility]      Assessment/Plan     Problem List Items Addressed This Visit        Cardiovascular and Mediastinum    Periodic headache syndrome, not intractable - Primary    Current Assessment & Plan     Headaches are newly identified.  Medication changes per orders.     Likely complex migraines    Start Depakote  mg qhs              Relevant Medications    acetaminophen (TYLENOL) 500 MG tablet    escitalopram (LEXAPRO) 10 MG tablet    divalproex (DEPAKOTE) 500 MG 24 hr tablet             No Follow-up on file.

## 2019-04-11 ENCOUNTER — TELEPHONE (OUTPATIENT)
Dept: ONCOLOGY | Facility: CLINIC | Age: 49
End: 2019-04-11

## 2019-04-11 NOTE — TELEPHONE ENCOUNTER
Discussed with Dr Rubi. Explained that migraines are not a long term side effect of chemo treatment - it's been about 5 years since she was treated. She received neurotoxic chemotherapy (TC) in 2011, this can cause neuropathy, but that doesn't fit very well with what she describes, and it wouldn't get worse this far out from treatment. Continue follow up with neurology.

## 2019-04-11 NOTE — TELEPHONE ENCOUNTER
----- Message from Sedrick Jose sent at 4/11/2019  8:28 AM EDT -----  Regarding: ASTON- QUESTION ABOUT SIDE EFFECTS  Contact: [phone]  PT HAS BEEN IN HOSPITAL MARCH 23RD AND 25TH WITH SYMPTOMS OF HURTING ALL OVER  DOWN LEGS TO FEET THEY THOUGHT IT WAS POSS. TIA  THEN FOUND OUT IT WAS COMPLEX MIGRAINE  PT IS WONDERING IF THIS IS A SIDE EFFECT OF THE CHEMO  PLEASE CALL BACK [phone]

## 2019-04-12 ENCOUNTER — TELEPHONE (OUTPATIENT)
Dept: CARDIOLOGY | Facility: CLINIC | Age: 49
End: 2019-04-12

## 2019-04-12 NOTE — TELEPHONE ENCOUNTER
Pt had a recent episode 3/23/19, TIA symptoms, CP/ palpitations, records pulled and in chart.  Pt is following with a neurologist but would like to be evaluated further for cardiac problems since she has a very strong family history. Will move appointment up. Pt aware we will call Monday with an appointment.

## 2019-04-25 ENCOUNTER — OFFICE VISIT (OUTPATIENT)
Dept: CARDIOLOGY | Facility: CLINIC | Age: 49
End: 2019-04-25

## 2019-04-25 ENCOUNTER — CLINICAL SUPPORT (OUTPATIENT)
Dept: CARDIOLOGY | Facility: CLINIC | Age: 49
End: 2019-04-25

## 2019-04-25 VITALS
DIASTOLIC BLOOD PRESSURE: 60 MMHG | HEIGHT: 63 IN | SYSTOLIC BLOOD PRESSURE: 100 MMHG | WEIGHT: 169 LBS | BODY MASS INDEX: 29.95 KG/M2 | HEART RATE: 80 BPM

## 2019-04-25 DIAGNOSIS — R00.2 PALPITATIONS: ICD-10-CM

## 2019-04-25 DIAGNOSIS — G45.9 TIA (TRANSIENT ISCHEMIC ATTACK): ICD-10-CM

## 2019-04-25 DIAGNOSIS — R06.00 PND (PAROXYSMAL NOCTURNAL DYSPNEA): ICD-10-CM

## 2019-04-25 DIAGNOSIS — R06.09 DYSPNEA ON EXERTION: ICD-10-CM

## 2019-04-25 DIAGNOSIS — E78.5 HYPERLIPIDEMIA LDL GOAL <100: ICD-10-CM

## 2019-04-25 DIAGNOSIS — R00.2 PALPITATIONS: Primary | ICD-10-CM

## 2019-04-25 PROBLEM — R07.89 CHEST PAIN, ATYPICAL: Status: ACTIVE | Noted: 2019-04-25

## 2019-04-25 PROCEDURE — 0296T PR EXT ECG > 48HR TO 21 DAY RCRD W/CONECT INTL RCRD: CPT | Performed by: INTERNAL MEDICINE

## 2019-04-25 PROCEDURE — 99214 OFFICE O/P EST MOD 30 MIN: CPT | Performed by: NURSE PRACTITIONER

## 2019-04-25 RX ORDER — ASPIRIN 81 MG/1
81 TABLET ORAL DAILY
COMMUNITY
End: 2020-01-16

## 2019-04-25 NOTE — PROGRESS NOTES
"Chief Complaint   Patient presents with   • Follow-up     Cardiac management. She has had 2 episodes weakness in both arms/legs, slurred speech, tiredness, see hospital discharge note. She states \"I have went to Neuro since episodes and have been told complex migraines\". She has an appointment with different Neuro 5/13/19 in [place]. She reports that she did not start Atorvastatin.   • Dizziness     Started on 3/23/19, she states \"this was a different kind of dizziness, it was not vertigo\".   • Chest Pain     On 3/23/19 she had severe pain to left arm that radiated to mid chest, went to ER. She states \"I broke out with sweat everywhere with this episode\".   • Palpitations     Having flutters and pounding heart beat that increased on 3/23/19 and 3/25/19 (attempting to obtain discharge summary 3/26/19).       Anders Flynn is a 49 y.o. female seen in January 2017 for initial cardiac evaluation. She had been to ER with chest pain and workup was negative.  She has no previously diagnosed hypertension and diabetes.  She does have history of hypercholesterolemia and breast cancer for which she has undergone surgery and chemotherapy. At consultation in 2017, her LDL was 194 and Lipitor recommended but she was reluctant to start statin. Stress test and echo showed no ischemia and normal LV function. We did not see her after that until returning in June 2018 after she developed more chest pain, seen in the ER with negative troponin, EKG, and CXR normal. She also reported heart pounding and waking at night with dyspnea. Holter and overnight oximetry recommended but not done secondary to insurance problems.     She presented to the ER on 3/23/19 with symptoms of possible TIA. She had slurred speech and bilateral weakness of her legs and arms. No loss of vision or aura, no light or sound sensitivity, no facial drooping. Symptoms lasted a few minutes then subsided.  CTA head and neck were normal. Metabolic " work up negative. She had recurrent symptoms on 3/25/19 and returned with work up including CT negative, MRI negative, echo with bubble showed no shunt. LDL was 130. Advised to take Lipitor and aspirin. Neurology consult recommended for evaluation of complex migraine versus partial complex seizure. She came today for follow up. Denies any recurrent symptoms. She decided not to take Lipitor but has continued aspirin. She denies chest pain, shortness of breath. Continues to have palpitations which is a fluttering and pounding occurring daily, not associated with dizziness or syncope. She did see a neurologist who diagnosed her with complex migraines. She has an appointment for second opinion on 5/13/19.   HPI         Cardiac History:    Past Surgical History:   Procedure Laterality Date   • CARDIOVASCULAR STRESS TEST  02/02/2017    [facility]- 6 min 31 sec, 93% THR, 162/70, negative for ischemia   • ECHO - CONVERTED  12/04/2013    @[facility]. . EF 65%.   • ECHO - CONVERTED  02/02/2017    EF 60%, normal diastolic function, mild MR   • ECHO - CONVERTED  03/25/2019    ([facility]) Echo with bubble- EF 60%, no shunt, RVSP 15 mmHg    • MASTECTOMY Bilateral 05/2011   • OTHER SURGICAL HISTORY  03/25/2019    MRI brain- no infarct, sinusitis   • OTHER SURGICAL HISTORY  03/25/2019    CT head- no infarct   • OTHER SURGICAL HISTORY  03/23/2019    CTA head and neck- normal        Current Outpatient Medications   Medication Sig Dispense Refill   • acetaminophen (TYLENOL) 500 MG tablet Take 500 mg by mouth Every 6 (Six) Hours As Needed for Mild Pain .     • aspirin 81 MG EC tablet Take 81 mg by mouth Daily.     • escitalopram (LEXAPRO) 10 MG tablet Take 2.5 mg by mouth Every Other Day.     • loratadine (CLARITIN) 10 MG tablet Take 10 mg by mouth Daily.     • meclizine (ANTIVERT) 12.5 MG tablet Take 12.5 mg by mouth 3 (Three) Times a Day As Needed for dizziness.     • omeprazole (priLOSEC) 20 MG capsule Take 20 mg by mouth As Needed.       No  current facility-administered medications for this visit.        Ciprofloxacin; Codeine; Erythromycin; Keflex [cephalexin]; Morphine and related; Penicillins; Percocet [oxycodone-acetaminophen]; Protonix [pantoprazole sodium]; Sulfa antibiotics; and Fluticasone    Past Medical History:   Diagnosis Date   • Anxiety associated with depression    • Cancer (CMS/HCC)     hx of breast cancer x2   • Depression    • H/O mastectomy    • Hearing difficulty     Bilaterally. Recurrent ear infections as well as allergies and fluid in her ears.Not progressive.    • Hyperlipidemia    • Hypothyroidism    • Migraines 2019    complex   • Multiple allergies    • Panic disorder    • Twitching     2016: Little bit of twitching in the left upper extremity that began about 3 months or so ago, again it is not progressive in the slightest but it is noticeable.Left upper extremity movement disorder, non-progressive, could be related to anxiety.    • Vitamin D deficiency        Social History     Socioeconomic History   • Marital status:      Spouse name: Not on file   • Number of children: Not on file   • Years of education: Not on file   • Highest education level: Not on file   Tobacco Use   • Smoking status: Former Smoker     Types: Cigarettes     Last attempt to quit: 2005     Years since quittin.6   • Smokeless tobacco: Never Used   Substance and Sexual Activity   • Alcohol use: No   • Drug use: No   • Sexual activity: Defer       Family History   Problem Relation Age of Onset   • Hypertension Mother    • Depression Mother    • Mental illness Mother    • Cancer Father    • Heart disease Father    • Heart disease Maternal Grandmother    • Stroke Maternal Grandmother    • Dementia Maternal Grandmother    • Cancer Maternal Grandfather    • Multiple sclerosis Maternal Grandfather    • Cancer Paternal Grandmother    • Diabetes Paternal Grandmother    • Kidney disease Paternal Grandmother    • Heart attack Paternal  "Grandfather    • Mental illness Maternal Aunt    • Parkinsonism Maternal Aunt    • Diabetes Maternal Aunt    • Multiple sclerosis Maternal Uncle    • Multiple sclerosis Paternal Aunt        Review of Systems   Constitution: Negative for decreased appetite, weakness, malaise/fatigue and weight loss.   HENT: Negative.    Eyes: Negative.    Cardiovascular: Positive for chest pain (one isolated episode ) and palpitations. Negative for dyspnea on exertion, leg swelling, orthopnea and syncope.   Respiratory: Positive for sleep disturbances due to breathing (PND). Negative for cough and shortness of breath.    Endocrine: Negative.    Hematologic/Lymphatic: Negative.  Negative for bleeding problem. Does not bruise/bleed easily.   Skin: Negative.    Musculoskeletal: Negative for falls and myalgias.   Gastrointestinal: Negative for abdominal pain and melena.   Genitourinary: Negative for dysuria and hematuria.   Neurological: Positive for dizziness.   Psychiatric/Behavioral: Negative for altered mental status.   Allergic/Immunologic: Negative.       Diabetes- No  Thyroid-normal    Objective     /60 (BP Location: Right arm)   Pulse 80   Ht 160 cm (62.99\")   Wt 76.7 kg (169 lb)   BMI 29.94 kg/m²     Physical Exam   Constitutional: She is oriented to person, place, and time. She appears well-developed and well-nourished. No distress.   HENT:   Head: Normocephalic.   Eyes: Pupils are equal, round, and reactive to light.   Neck: Normal range of motion.   Cardiovascular: Normal rate, regular rhythm and intact distal pulses.   Murmur heard.   Systolic murmur is present with a grade of 1/6 at the apex.  Pulmonary/Chest: Effort normal and breath sounds normal. No respiratory distress. She has no wheezes. She has no rales.   Abdominal: Soft. Bowel sounds are normal. She exhibits no distension.   Musculoskeletal: Normal range of motion. She exhibits no edema.   Neurological: She is alert and oriented to person, place, and " time. She displays normal reflexes. No cranial nerve deficit. She exhibits normal muscle tone. Coordination normal.   Skin: Skin is warm and dry. She is not diaphoretic.   Psychiatric: She has a normal mood and affect.   Nursing note and vitals reviewed.     Procedures          Assessment/Plan    Heart rate and blood pressure are stable. Hospital records reviewed with her. Work up so far with CTA head and neck, echo with bubble, and MRI have shown no evidence of pathology. Her symptoms appear to be neurologic and she has been diagnosed with complex migraine. She is planning to follow up for second opinion next month. Will advise on holter monitoring for five days to evaluate the palpitations and rule out and PAF or SVT causing her symptoms. She is advised to continue aspirin 81 mg for stroke prevention. Her lipids remain elevated at 130, but she has declined statin therapy. Encouraged low saturated fat diet. Will place order for overnight oximetry to rule out sleep apnea as she does have difficulty maintaining restful sleep and PND with sudden waking associated with smothering feeling. We discussed repeating stress test to rule out any ischemia as she did have one isolated episode of left sided discomfort radiating to left arm during her first acute episode. Will consider this if she has recurrent angina or worsening dyspnea after holter and second neurology work up is done.  She is anxious and worried about her overall health status. She may benefit with taking full dose Lexapro daily if you agree. Further recommendations to follow holter and overnight. Follow up in six months or sooner if needed.      Asuncion was seen today for follow-up, dizziness, chest pain and palpitations.    Diagnoses and all orders for this visit:    Palpitations  -     Holter Monitor - 72 Hour Up To 21 Days; Future    TIA (transient ischemic attack)  -     Holter Monitor - 72 Hour Up To 21 Days; Future    Dyspnea on exertion  -     Overnight  Sleep Oximetry Study; Future    PND (paroxysmal nocturnal dyspnea)  -     Overnight Sleep Oximetry Study; Future    Hyperlipidemia LDL goal <100        Patient's Body mass index is 29.94 kg/m². BMI is above normal parameters. Recommendations include: nutrition counseling.                     Electronically signed by BENJIE Herrmann,  April 26, 2019 10:04 AM

## 2019-04-25 NOTE — PATIENT INSTRUCTIONS
Mediterranean Diet  A Mediterranean diet refers to food and lifestyle choices that are based on the traditions of countries located on the [place]. This way of eating has been shown to help prevent certain conditions and improve outcomes for people who have chronic diseases, like kidney disease and heart disease.  What are tips for following this plan?  Lifestyle  · Cook and eat meals together with your family, when possible.  · Drink enough fluid to keep your urine clear or pale yellow.  · Be physically active every day. This includes:  ? Aerobic exercise like running or swimming.  ? Leisure activities like gardening, walking, or housework.  · Get 7-8 hours of sleep each night.  · If recommended by your health care provider, drink red wine in moderation. This means 1 glass a day for nonpregnant women and 2 glasses a day for men. A glass of wine equals 5 oz (150 mL).  Reading food labels  · Check the serving size of packaged foods. For foods such as rice and pasta, the serving size refers to the amount of cooked product, not dry.  · Check the total fat in packaged foods. Avoid foods that have saturated fat or trans fats.  · Check the ingredients list for added sugars, such as corn syrup.  Shopping  · At the grocery store, buy most of your food from the areas near the walls of the store. This includes:  ? Fresh fruits and vegetables (produce).  ? Grains, beans, nuts, and seeds. Some of these may be available in unpackaged forms or large amounts (in bulk).  ? Fresh seafood.  ? Poultry and eggs.  ? Low-fat dairy products.  · Buy whole ingredients instead of prepackaged foods.  · Buy fresh fruits and vegetables in-season from local farmers markets.  · Buy frozen fruits and vegetables in resealable bags.  · If you do not have access to quality fresh seafood, buy precooked frozen shrimp or canned fish, such as tuna, salmon, or sardines.  · Buy small amounts of raw or cooked vegetables, salads, or olives from the  deli or salad bar at your store.  · Stock your pantry so you always have certain foods on hand, such as olive oil, canned tuna, canned tomatoes, rice, pasta, and beans.  Cooking  · Cook foods with extra-virgin olive oil instead of using butter or other vegetable oils.  · Have meat as a side dish, and have vegetables or grains as your main dish. This means having meat in small portions or adding small amounts of meat to foods like pasta or stew.  · Use beans or vegetables instead of meat in common dishes like chili or lasagna.  · [place] with different cooking methods. Try roasting or broiling vegetables instead of steaming or sautéeing them.  · Add frozen vegetables to soups, stews, pasta, or rice.  · Add nuts or seeds for added healthy fat at each meal. You can add these to yogurt, salads, or vegetable dishes.  · Marinate fish or vegetables using olive oil, lemon juice, garlic, and fresh herbs.  Meal planning  · Plan to eat 1 vegetarian meal one day each week. Try to work up to 2 vegetarian meals, if possible.  · Eat seafood 2 or more times a week.  · Have healthy snacks readily available, such as:  ? Vegetable sticks with hummus.  ? Greek yogurt.  ? Fruit and nut trail mix.  · Eat balanced meals throughout the week. This includes:  ? Fruit: 2-3 servings a day  ? Vegetables: 4-5 servings a day  ? Low-fat dairy: 2 servings a day  ? Fish, poultry, or lean meat: 1 serving a day  ? Beans and legumes: 2 or more servings a week  ? Nuts and seeds: 1-2 servings a day  ? Whole grains: 6-8 servings a day  ? Extra-virgin olive oil: 3-4 servings a day  · Limit red meat and sweets to only a few servings a month  What are my food choices?  · Mediterranean diet  ? Recommended  ? Grains: Whole-grain pasta. Brown rice. Bulgar wheat. Polenta. Couscous. Whole-wheat bread. Oatmeal. Quinoa.  ? Vegetables: Artichokes. Beets. Broccoli. Cabbage. Carrots. Eggplant. Green beans. Chard. Kale. Spinach. Onions. Leeks. Peas. Squash.  Tomatoes. Peppers. Radishes.  ? Fruits: Apples. Apricots. Avocado. Berries. Bananas. Cherries. Dates. Figs. Grapes. Angela. Melon. Oranges. Peaches. Plums. Pomegranate.  ? Meats and other protein foods: Beans. Almonds. Sunflower seeds. Pine nuts. Peanuts. Cod. [place]. Scallops. Shrimp. Tuna. Tilapia. Clams. Oysters. Eggs.  ? Dairy: Low-fat milk. Cheese. Greek yogurt.  ? Beverages: Water. Red wine. Herbal tea.  ? Fats and oils: Extra virgin olive oil. Avocado oil. Grape seed oil.  ? Sweets and desserts: Greek yogurt with honey. Baked apples. Poached pears. Trail mix.  ? Seasoning and other foods: Basil. Cilantro. Coriander. Cumin. Mint. Parsley. Kamlesh. Rosemary. Tarragon. Garlic. Oregano. Thyme. Pepper. Balsalmic vinegar. Tahini. Hummus. Tomato sauce. Olives. Mushrooms.  ? Limit these  ? Grains: Prepackaged pasta or rice dishes. Prepackaged cereal with added sugar.  ? Vegetables: Deep fried potatoes (french fries).  ? Fruits: Fruit canned in syrup.  ? Meats and other protein foods: Beef. Pork. Lamb. Poultry with skin. Hot dogs. [place].  ? Dairy: Ice cream. Sour cream. Whole milk.  ? Beverages: Juice. Sugar-sweetened soft drinks. Beer. Liquor and spirits.  ? Fats and oils: Butter. Canola oil. Vegetable oil. Beef fat (tallow). Lard.  ? Sweets and desserts: Cookies. Cakes. Pies. Candy.  ? Seasoning and other foods: Mayonnaise. Premade sauces and marinades.  ? The items listed may not be a complete list. Talk with your dietitian about what dietary choices are right for you.  Summary  · The Mediterranean diet includes both food and lifestyle choices.  · Eat a variety of fresh fruits and vegetables, beans, nuts, seeds, and whole grains.  · Limit the amount of red meat and sweets that you eat.  · Talk with your health care provider about whether it is safe for you to drink red wine in moderation. This means 1 glass a day for nonpregnant women and 2 glasses a day for men. A glass of wine equals 5 oz (150 mL).  This information  is not intended to replace advice given to you by your health care provider. Make sure you discuss any questions you have with your health care provider.  Document Released: 08/10/2017 Document Revised: 09/12/2017 Document Reviewed: 08/10/2017  [employer] Patient Education © 2019 [employer]

## 2019-04-26 PROBLEM — E78.5 HYPERLIPIDEMIA LDL GOAL <100: Status: ACTIVE | Noted: 2019-04-26

## 2019-05-01 ENCOUNTER — OUTSIDE FACILITY SERVICE (OUTPATIENT)
Dept: CARDIOLOGY | Facility: CLINIC | Age: 49
End: 2019-05-01

## 2019-05-01 ENCOUNTER — TELEPHONE (OUTPATIENT)
Dept: CARDIOLOGY | Facility: CLINIC | Age: 49
End: 2019-05-01

## 2019-05-01 PROCEDURE — 0298T PR EXT ECG > 48HR TO 21 DAY REVIEW AND INTERPRETATN: CPT | Performed by: INTERNAL MEDICINE

## 2019-05-01 NOTE — TELEPHONE ENCOUNTER
Holter report complete. Reviewed with baseline sinus rhythm. Average rate 71 bpm, minimum 48, maximum 137. Few episodes of sinus bradycardia but nothing significant.     No atrial fib, SVT, VT.     No arrhythmia noted as etiology of TIA.

## 2019-05-02 NOTE — TELEPHONE ENCOUNTER
Attempted to notify patient of holter monitor results, unable to reach, left message for patient to call back.

## 2019-05-10 ENCOUNTER — OFFICE VISIT (OUTPATIENT)
Dept: ONCOLOGY | Facility: CLINIC | Age: 49
End: 2019-05-10

## 2019-05-10 VITALS
DIASTOLIC BLOOD PRESSURE: 58 MMHG | HEART RATE: 77 BPM | WEIGHT: 170 LBS | BODY MASS INDEX: 30.12 KG/M2 | TEMPERATURE: 98.1 F | HEIGHT: 63 IN | SYSTOLIC BLOOD PRESSURE: 117 MMHG | RESPIRATION RATE: 17 BRPM

## 2019-05-10 DIAGNOSIS — C50.912 RECURRENT CANCER OF LEFT BREAST (HCC): Primary | ICD-10-CM

## 2019-05-10 PROCEDURE — 99213 OFFICE O/P EST LOW 20 MIN: CPT | Performed by: INTERNAL MEDICINE

## 2019-05-10 NOTE — PROGRESS NOTES
"      PROBLEM LIST:  1. Stage II Left breast cancer, ER+ [place]+ Her2 negative.  A) bilateral mastectomy 5/2011.  Pathology showed 8 negative lymph nodes.  Adjuvant chemotherapy with TC.  Intolerant of tamoxifen, femara, and aromasin.  B) recurrence in left axillary lymph nodes - 3 or 4 LN involved, surgically removed 11/2014.  Treated with FEC x 5 cycles.  2. Anxiety  3. Cognitive impairment    Subjective      CC: breast cancer    HISTORY OF PRESENT ILLNESS:   Asuncion Flynn returns for follow-up.   She says she is feeling about the same.  She continues to struggle with poor focus and memory.  She is taking a quarter of the Lexapro every other day and says she cannot tolerate more than that.  She says she has tried a lot of other antidepressants and cannot tolerate those either.    Past Medical History, Past Surgical History, Social History, Family History have been reviewed and are without significant changes except as mentioned.    Review of Systems   A comprehensive 14 point review of systems was performed and was negative except as mentioned.    Medications:  The current medication list was reviewed in the EMR    ALLERGIES:    Allergies   Allergen Reactions   • Ciprofloxacin Itching   • Codeine Hallucinations   • Erythromycin Itching   • Keflex [Cephalexin] Other (See Comments)     Caused lips to turn blue in color   • Morphine And Related Itching     Difficulty breathing   • Penicillins Itching   • Percocet [Oxycodone-Acetaminophen] Nausea And Vomiting   • Protonix [Pantoprazole Sodium] Other (See Comments)     nervous   • Sulfa Antibiotics Itching   • Fluticasone Anxiety and Tinnitus       Objective      /58   Pulse 77   Temp 98.1 °F (36.7 °C) (Temporal)   Resp 17   Ht 160 cm (62.99\")   Wt 77.1 kg (170 lb)   BMI 30.12 kg/m²      Performance Status: 0    General: well appearing female in no acute distress  Neuro: alert and oriented  HEENT: sclera anicteric, oropharynx clear  Lymphatics: no cervical, " supraclavicular, or axillary adenopathy  Cardiovascular: regular rate and rhythm, no murmurs  Chest: Status post bilateral mastectomy with implant reconstruction.  No palpable masses  Lungs: clear to auscultation bilaterally  Abdomen: soft, nontender, nondistended.  No palpable organomegaly  Extremeties: no lower extremity edema  Skin: no rashes, lesions, bruising, or petechiae  Psych: mood and affect appropriate          Assessment/Plan   Asuncion Flynn is a 49 y.o. year old female with a history of early stage ER positive HER-2 negative breast cancer who had a local recurrence now about 4 years ago.  She was treated with adjuvant chemotherapy and has not tolerated adjuvant hormonal therapy due to psychiatric disturbance.  She has no evidence of cancer recurrence.  She continues to struggle with cognitive impairment that she has had since her chemotherapy treatment.  We discussed that cognitive impairment is a possible complication of chemotherapy.  In her case I think that anxiety and depression may be contributing to this as well.  I think she would benefit from evaluation by psychiatry.  At this time she is unwilling to do this.  She cites concerns about frequency of visits and additional co-pays.  She says she will think about it and I told her to just let us know if she wants us to set up an appointment.  Otherwise I will see her back in 6 months.            I spent 15 minutes with the patient. I spent > 50% percent of this time counseling and discussing prognosis, diagnostic testing, evaluation, current status and management.      Arcelia Rubi MD  [facility]    5/10/2019          CC:

## 2019-11-21 ENCOUNTER — OFFICE VISIT (OUTPATIENT)
Dept: ONCOLOGY | Facility: CLINIC | Age: 49
End: 2019-11-21

## 2019-11-21 VITALS
SYSTOLIC BLOOD PRESSURE: 123 MMHG | HEIGHT: 63 IN | DIASTOLIC BLOOD PRESSURE: 70 MMHG | WEIGHT: 171 LBS | OXYGEN SATURATION: 99 % | BODY MASS INDEX: 30.3 KG/M2 | RESPIRATION RATE: 18 BRPM | TEMPERATURE: 97.2 F | HEART RATE: 73 BPM

## 2019-11-21 DIAGNOSIS — C50.912 RECURRENT CANCER OF LEFT BREAST (HCC): Primary | ICD-10-CM

## 2019-11-21 PROCEDURE — 99213 OFFICE O/P EST LOW 20 MIN: CPT | Performed by: INTERNAL MEDICINE

## 2019-11-21 NOTE — PROGRESS NOTES
"      PROBLEM LIST:  1. Stage II Left breast cancer, ER+ [place]+ Her2 negative.  A) bilateral mastectomy 5/2011.  Pathology showed 8 negative lymph nodes.  Adjuvant chemotherapy with TC.  Intolerant of tamoxifen, femara, and aromasin.  B) recurrence in left axillary lymph nodes - 3 or 4 LN involved, surgically removed 11/2014.  Treated with FEC x 5 cycles.  2. Anxiety  3. Cognitive impairment    Subjective      CC: breast cancer    HISTORY OF PRESENT ILLNESS:   Asuncion Flynn returns for follow-up.  She says she is doing about the same.  She has an appointment to go to [place] in January to be evaluated by neurology due to her ongoing cognitive symptoms.  She also mentions that she has some occasional tenderness over the xiphoid process.  She has noticed this for several years.    Past Medical History, Past Surgical History, Social History, Family History have been reviewed and are without significant changes except as mentioned.    Review of Systems   A comprehensive 14 point review of systems was performed and was negative except as mentioned.    Medications:  The current medication list was reviewed in the EMR    ALLERGIES:    Allergies   Allergen Reactions   • Ciprofloxacin Itching   • Codeine Hallucinations   • Erythromycin Itching   • Keflex [Cephalexin] Other (See Comments)     Caused lips to turn blue in color   • Morphine And Related Itching     Difficulty breathing   • Penicillins Itching   • Percocet [Oxycodone-Acetaminophen] Nausea And Vomiting   • Protonix [Pantoprazole Sodium] Other (See Comments)     nervous   • Sulfa Antibiotics Itching   • Fluticasone Anxiety and Tinnitus       Objective      /70 Comment: RUE  Pulse 73   Temp 97.2 °F (36.2 °C) (Temporal)   Resp 18   Ht 160 cm (63\")   Wt 77.6 kg (171 lb)   SpO2 99% Comment: RA  BMI 30.29 kg/m²      Performance Status: 0    General: well appearing female in no acute distress  Neuro: alert and oriented  HEENT: sclera anicteric, oropharynx " clear  Lymphatics: no cervical, supraclavicular, or axillary adenopathy  Cardiovascular: regular rate and rhythm, no murmurs  Chest: Status post bilateral mastectomy with implant reconstruction.  Some tenderness over the xiphoid process.  Lungs: clear to auscultation bilaterally  Abdomen: soft, nontender, nondistended.  No palpable organomegaly  Extremeties: no lower extremity edema  Skin: no rashes, lesions, bruising, or petechiae  Psych: mood and affect appropriate          Assessment/Plan   Asuncion Flynn is a 49 y.o. year old female with a history of early stage ER positive HER-2 negative breast cancer who had a local recurrence now about 5 years ago.  She was treated with adjuvant chemotherapy and has not tolerated adjuvant hormonal therapy due to psychiatric disturbance.     She has no evidence of cancer recurrence at this time.  Now that it has been 5 years since her most recent surgery we will space out her visits to once yearly.    She has some tenderness over the xiphoid process, I will order a chest x-ray and contact her once I have seen the results.  She would like to have this done at her local hospital.    Follow-up in 1 year.          I spent 15 minutes with the patient. I spent > 50% percent of this time counseling and discussing prognosis, diagnostic testing, evaluation, current status and management.      Arcelia Rubi MD  [facility] Hematology and Oncology    11/21/2019          CC:

## 2019-12-02 ENCOUNTER — TELEPHONE (OUTPATIENT)
Dept: ONCOLOGY | Facility: CLINIC | Age: 49
End: 2019-12-02

## 2019-12-02 NOTE — TELEPHONE ENCOUNTER
Patient called to see if we had recived results from chest xrays and disk from the last two that the patient had done. Please call her back

## 2019-12-03 DIAGNOSIS — R07.89 CHEST PAIN, ATYPICAL: Primary | ICD-10-CM

## 2019-12-04 ENCOUNTER — TELEPHONE (OUTPATIENT)
Dept: ONCOLOGY | Facility: CLINIC | Age: 49
End: 2019-12-04

## 2019-12-04 NOTE — TELEPHONE ENCOUNTER
Patient called she wants to know if she can take some ibuprofen she has a tiny tear in her shoulder and the tylenol is not working? Please call.

## 2019-12-04 NOTE — TELEPHONE ENCOUNTER
Returned call and spoke to pt. Let her know ok to rotate ibuprofen with tylenol. 600mg PO q 6hrs.

## 2019-12-09 ENCOUNTER — TELEPHONE (OUTPATIENT)
Dept: ONCOLOGY | Facility: CLINIC | Age: 49
End: 2019-12-09

## 2019-12-09 NOTE — TELEPHONE ENCOUNTER
Patient called and after reviewing the MRI she has 2 tears around her should one being a labral tear and they are saying surgery is going to be the only way to fix this. She wants Dr. Rubi to call her to discuss this. This is the same arm she had the lymph nodes removed.

## 2019-12-10 ENCOUNTER — TELEPHONE (OUTPATIENT)
Dept: ONCOLOGY | Facility: CLINIC | Age: 49
End: 2019-12-10

## 2019-12-10 NOTE — TELEPHONE ENCOUNTER
Returned patient's call.  She has a rotator cuff and labral tear and is seeing an orthopedic specialist next week.  She is having some pain and swelling in her arm and is very worried about lymphedema.    Suggested she go to see PT for lymphedema evaluation, and if surgery is recommended, then can consider this, though with the understanding that there may be increased risk of complications.  There are published reports of shoulder surgery without increased risk of complications in patients with prior lymph node dissection.

## 2019-12-12 ENCOUNTER — APPOINTMENT (OUTPATIENT)
Dept: CT IMAGING | Facility: HOSPITAL | Age: 49
End: 2019-12-12

## 2019-12-12 ENCOUNTER — HOSPITAL ENCOUNTER (EMERGENCY)
Facility: HOSPITAL | Age: 49
Discharge: HOME | End: 2019-12-12
Attending: EMERGENCY MEDICINE | Admitting: EMERGENCY MEDICINE

## 2019-12-12 ENCOUNTER — APPOINTMENT (OUTPATIENT)
Dept: CARDIOLOGY | Facility: HOSPITAL | Age: 49
End: 2019-12-12

## 2019-12-12 VITALS
TEMPERATURE: 98.4 F | SYSTOLIC BLOOD PRESSURE: 114 MMHG | OXYGEN SATURATION: 96 % | HEIGHT: 63 IN | BODY MASS INDEX: 30.65 KG/M2 | WEIGHT: 173 LBS | HEART RATE: 86 BPM | RESPIRATION RATE: 18 BRPM | DIASTOLIC BLOOD PRESSURE: 76 MMHG

## 2019-12-12 DIAGNOSIS — I82.A12 ACUTE DEEP VEIN THROMBOSIS (DVT) OF AXILLARY VEIN OF LEFT UPPER EXTREMITY (HCC): Primary | ICD-10-CM

## 2019-12-12 LAB
ALBUMIN SERPL-MCNC: 4.1 G/DL (ref 3.5–5.2)
ALBUMIN/GLOB SERPL: 1.4 G/DL
ALP SERPL-CCNC: 105 U/L (ref 39–117)
ALT SERPL W P-5'-P-CCNC: 19 U/L (ref 1–33)
ANION GAP SERPL CALCULATED.3IONS-SCNC: 12 MMOL/L (ref 5–15)
AST SERPL-CCNC: 20 U/L (ref 1–32)
BASOPHILS # BLD AUTO: 0.07 10*3/MM3 (ref 0–0.2)
BASOPHILS NFR BLD AUTO: 0.8 % (ref 0–1.5)
BH CV UPPER VENOUS LEFT AXILLARY AUGMENT: NORMAL
BH CV UPPER VENOUS LEFT AXILLARY COLOR: 1
BH CV UPPER VENOUS LEFT AXILLARY COMPETENT: NORMAL
BH CV UPPER VENOUS LEFT AXILLARY COMPRESS: NORMAL
BH CV UPPER VENOUS LEFT AXILLARY PHASIC: NORMAL
BH CV UPPER VENOUS LEFT AXILLARY SPONT: NORMAL
BH CV UPPER VENOUS LEFT BASILIC FOREARM COMPRESS: NORMAL
BH CV UPPER VENOUS LEFT BASILIC UPPER COLOR: 1
BH CV UPPER VENOUS LEFT BASILIC UPPER COMPRESS: NORMAL
BH CV UPPER VENOUS LEFT BRACHIAL COLOR: 1
BH CV UPPER VENOUS LEFT BRACHIAL COMPRESS: NORMAL
BH CV UPPER VENOUS LEFT CEPHALIC FOREARM COMPRESS: NORMAL
BH CV UPPER VENOUS LEFT CEPHALIC UPPER COMPRESS: NORMAL
BH CV UPPER VENOUS LEFT INTERNAL JUGULAR AUGMENT: NORMAL
BH CV UPPER VENOUS LEFT INTERNAL JUGULAR COMPRESS: NORMAL
BH CV UPPER VENOUS LEFT INTERNAL JUGULAR PHASIC: NORMAL
BH CV UPPER VENOUS LEFT INTERNAL JUGULAR SPONT: NORMAL
BH CV UPPER VENOUS LEFT RADIAL COMPRESS: NORMAL
BH CV UPPER VENOUS LEFT SUBCLAVIAN AUGMENT: NORMAL
BH CV UPPER VENOUS LEFT SUBCLAVIAN COMPRESS: NORMAL
BH CV UPPER VENOUS LEFT SUBCLAVIAN PHASIC: NORMAL
BH CV UPPER VENOUS LEFT SUBCLAVIAN SPONT: NORMAL
BH CV UPPER VENOUS LEFT ULNAR COMPRESS: NORMAL
BH CV UPPER VENOUS RIGHT INTERNAL JUGULAR AUGMENT: NORMAL
BH CV UPPER VENOUS RIGHT INTERNAL JUGULAR COMPRESS: NORMAL
BH CV UPPER VENOUS RIGHT INTERNAL JUGULAR PHASIC: NORMAL
BH CV UPPER VENOUS RIGHT INTERNAL JUGULAR SPONT: NORMAL
BILIRUB SERPL-MCNC: 0.3 MG/DL (ref 0.2–1.2)
BUN BLD-MCNC: 20 MG/DL (ref 6–20)
BUN BLDA-MCNC: 21 MG/DL (ref 8–26)
BUN/CREAT SERPL: 19 (ref 7–25)
CA-I BLDA-SCNC: 1.19 MMOL/L (ref 1.2–1.32)
CALCIUM SPEC-SCNC: 9.3 MG/DL (ref 8.6–10.5)
CHLORIDE BLDA-SCNC: 103 MMOL/L (ref 98–109)
CHLORIDE SERPL-SCNC: 104 MMOL/L (ref 98–107)
CO2 BLDA-SCNC: 27 MMOL/L (ref 24–29)
CO2 SERPL-SCNC: 26 MMOL/L (ref 22–29)
CREAT BLD-MCNC: 1.05 MG/DL (ref 0.57–1)
CREAT BLDA-MCNC: 1.1 MG/DL (ref 0.6–1.3)
DEPRECATED RDW RBC AUTO: 39.1 FL (ref 37–54)
EOSINOPHIL # BLD AUTO: 0.46 10*3/MM3 (ref 0–0.4)
EOSINOPHIL NFR BLD AUTO: 5.1 % (ref 0.3–6.2)
ERYTHROCYTE [DISTWIDTH] IN BLOOD BY AUTOMATED COUNT: 11.5 % (ref 12.3–15.4)
GFR SERPL CREATININE-BSD FRML MDRD: 56 ML/MIN/1.73
GLOBULIN UR ELPH-MCNC: 2.9 GM/DL
GLUCOSE BLD-MCNC: 99 MG/DL (ref 65–99)
GLUCOSE BLDC GLUCOMTR-MCNC: 97 MG/DL (ref 70–130)
HCT VFR BLD AUTO: 39.8 % (ref 34–46.6)
HCT VFR BLDA CALC: 38 % (ref 38–51)
HGB BLD-MCNC: 13.2 G/DL (ref 12–15.9)
HGB BLDA-MCNC: 12.9 G/DL (ref 12–17)
IMM GRANULOCYTES # BLD AUTO: 0.02 10*3/MM3 (ref 0–0.05)
IMM GRANULOCYTES NFR BLD AUTO: 0.2 % (ref 0–0.5)
INR PPP: 1.02 (ref 0.85–1.16)
LYMPHOCYTES # BLD AUTO: 3.38 10*3/MM3 (ref 0.7–3.1)
LYMPHOCYTES NFR BLD AUTO: 37.7 % (ref 19.6–45.3)
MCH RBC QN AUTO: 30.7 PG (ref 26.6–33)
MCHC RBC AUTO-ENTMCNC: 33.2 G/DL (ref 31.5–35.7)
MCV RBC AUTO: 92.6 FL (ref 79–97)
MONOCYTES # BLD AUTO: 0.68 10*3/MM3 (ref 0.1–0.9)
MONOCYTES NFR BLD AUTO: 7.6 % (ref 5–12)
NEUTROPHILS # BLD AUTO: 4.35 10*3/MM3 (ref 1.7–7)
NEUTROPHILS NFR BLD AUTO: 48.6 % (ref 42.7–76)
NRBC BLD AUTO-RTO: 0 /100 WBC (ref 0–0.2)
PLATELET # BLD AUTO: 324 10*3/MM3 (ref 140–450)
PMV BLD AUTO: 9.8 FL (ref 6–12)
POTASSIUM BLD-SCNC: 4.3 MMOL/L (ref 3.5–5.2)
POTASSIUM BLDA-SCNC: 4.2 MMOL/L (ref 3.5–4.9)
PROT SERPL-MCNC: 7 G/DL (ref 6–8.5)
PROTHROMBIN TIME: 12.9 SECONDS (ref 11.2–14.3)
RBC # BLD AUTO: 4.3 10*6/MM3 (ref 3.77–5.28)
SODIUM BLD-SCNC: 142 MMOL/L (ref 136–145)
SODIUM BLDA-SCNC: 140 MMOL/L (ref 138–146)
WBC NRBC COR # BLD: 8.96 10*3/MM3 (ref 3.4–10.8)

## 2019-12-12 PROCEDURE — 93971 EXTREMITY STUDY: CPT

## 2019-12-12 PROCEDURE — 85610 PROTHROMBIN TIME: CPT | Performed by: PHYSICIAN ASSISTANT

## 2019-12-12 PROCEDURE — 71275 CT ANGIOGRAPHY CHEST: CPT

## 2019-12-12 PROCEDURE — 80053 COMPREHEN METABOLIC PANEL: CPT | Performed by: PHYSICIAN ASSISTANT

## 2019-12-12 PROCEDURE — 80047 BASIC METABLC PNL IONIZED CA: CPT

## 2019-12-12 PROCEDURE — 99284 EMERGENCY DEPT VISIT MOD MDM: CPT

## 2019-12-12 PROCEDURE — 0 IOPAMIDOL PER 1 ML: Performed by: EMERGENCY MEDICINE

## 2019-12-12 PROCEDURE — 93971 EXTREMITY STUDY: CPT | Performed by: INTERNAL MEDICINE

## 2019-12-12 PROCEDURE — 85025 COMPLETE CBC W/AUTO DIFF WBC: CPT | Performed by: PHYSICIAN ASSISTANT

## 2019-12-12 PROCEDURE — 85014 HEMATOCRIT: CPT

## 2019-12-12 RX ORDER — TRAMADOL HYDROCHLORIDE 50 MG/1
50 TABLET ORAL EVERY 4 HOURS PRN
Qty: 15 TABLET | Refills: 0 | Status: SHIPPED | OUTPATIENT
Start: 2019-12-12 | End: 2020-02-13 | Stop reason: SDUPTHER

## 2019-12-12 RX ORDER — IBUPROFEN 600 MG/1
600 TABLET ORAL ONCE
Status: COMPLETED | OUTPATIENT
Start: 2019-12-12 | End: 2019-12-12

## 2019-12-12 RX ORDER — ONDANSETRON 4 MG/1
4 TABLET, ORALLY DISINTEGRATING ORAL EVERY 8 HOURS PRN
Qty: 12 TABLET | Refills: 0 | Status: SHIPPED | OUTPATIENT
Start: 2019-12-12

## 2019-12-12 RX ADMIN — APIXABAN 10 MG: 5 TABLET, FILM COATED ORAL at 17:04

## 2019-12-12 RX ADMIN — IOPAMIDOL 75 ML: 755 INJECTION, SOLUTION INTRAVENOUS at 14:52

## 2019-12-12 RX ADMIN — IBUPROFEN 600 MG: 600 TABLET, FILM COATED ORAL at 12:44

## 2019-12-12 NOTE — ED PROVIDER NOTES
Subjective   41-year-old female that presents to the emergency room today with a swollen tender left arm.  Patient has a history of breast cancer for which she has had bilateral mastectomy and did have to have several lymph nodes removed from the left upper extremity.  Patient reports that she had a duplex Doppler done about 2 weeks ago which was normal.  Had an MRI of the shoulder which showed some fluid in significant injury to the left shoulder.  But states over the past 3 days the left arm is become edematous and increasingly painful.  Patient denies any numbness numbness or tingling associated with this patient reports she had no short shortness of breath has had a little bit of chest pain but she is unclear whether this was pleuritic in nature or not but it was yesterday and is resolved.      History provided by:  Parent and significant other   used: No    Arm Swelling   Location:  Arm, wrist and hand  Arm location:  L arm, L forearm and L upper arm  Wrist location:  L wrist  Hand location:  L hand  Injury: no    Pain details:     Quality:  Dull and pressure    Radiates to:  Does not radiate    Severity:  Moderate    Onset quality:  Gradual    Timing:  Constant    Progression:  Worsening  Handedness:  Right-handed  Dislocation: no    Foreign body present:  No foreign bodies  Prior injury to area: Previous bilateral mastectomy and lymph node dissection on the left.  No prior history of lymphedema.  Associated symptoms: no back pain and no neck pain    Risk factors comment:  Bilateral mastectomies due to breast cancer      Review of Systems   Constitutional: Negative for chills.   Respiratory: Negative for chest tightness and shortness of breath.    Cardiovascular: Positive for chest pain. Negative for palpitations.   Genitourinary: Negative for dysuria, frequency and urgency.   Musculoskeletal: Negative for back pain and neck pain.   Skin: Negative for pallor and rash.    Psychiatric/Behavioral: Negative.    All other systems reviewed and are negative.      Past Medical History:   Diagnosis Date   • Anxiety associated with depression    • Cancer (CMS/HCC)     hx of breast cancer x2   • Depression    • H/O mastectomy    • Hearing difficulty     Bilaterally. Recurrent ear infections as well as allergies and fluid in her ears.Not progressive.    • Hyperlipidemia    • Hypothyroidism    • Migraines 04/2019    complex   • Multiple allergies    • Panic disorder    • Twitching     4/7/2016: Little bit of twitching in the left upper extremity that began about 3 months or so ago, again it is not progressive in the slightest but it is noticeable.Left upper extremity movement disorder, non-progressive, could be related to anxiety.    • Vitamin D deficiency        Allergies   Allergen Reactions   • Ciprofloxacin Itching   • Codeine Hallucinations   • Erythromycin Itching   • Keflex [Cephalexin] Other (See Comments)     Caused lips to turn blue in color   • Morphine And Related Itching     Difficulty breathing   • Penicillins Itching   • Percocet [Oxycodone-Acetaminophen] Nausea And Vomiting   • Protonix [Pantoprazole Sodium] Other (See Comments)     nervous   • Sulfa Antibiotics Itching   • Fluticasone Anxiety and Tinnitus       Past Surgical History:   Procedure Laterality Date   • CARDIOVASCULAR STRESS TEST  02/02/2017    [facility]- 6 min 31 sec, 93% THR, 162/70, negative for ischemia   • ECHO - CONVERTED  12/04/2013    @[facility]. . EF 65%.   • ECHO - CONVERTED  02/02/2017    EF 60%, normal diastolic function, mild MR   • ECHO - CONVERTED  03/25/2019    ([facility]) Echo with bubble- EF 60%, no shunt, RVSP 15 mmHg    • MASTECTOMY Bilateral 05/2011   • OTHER SURGICAL HISTORY  03/25/2019    MRI brain- no infarct, sinusitis   • OTHER SURGICAL HISTORY  03/25/2019    CT head- no infarct   • OTHER SURGICAL HISTORY  03/23/2019    CTA head and neck- normal        Family History   Problem Relation Age of  Onset   • Hypertension Mother    • Depression Mother    • Mental illness Mother    • Cancer Father    • Heart disease Father    • Heart disease Maternal Grandmother    • Stroke Maternal Grandmother    • Dementia Maternal Grandmother    • Cancer Maternal Grandfather    • Multiple sclerosis Maternal Grandfather    • Cancer Paternal Grandmother    • Diabetes Paternal Grandmother    • Kidney disease Paternal Grandmother    • Heart attack Paternal Grandfather    • Mental illness Maternal Aunt    • Parkinsonism Maternal Aunt    • Diabetes Maternal Aunt    • Multiple sclerosis Maternal Uncle    • Multiple sclerosis Paternal Aunt        Social History     Socioeconomic History   • Marital status:      Spouse name: Not on file   • Number of children: Not on file   • Years of education: Not on file   • Highest education level: Not on file   Tobacco Use   • Smoking status: Former Smoker     Types: Cigarettes     Last attempt to quit: 2005     Years since quittin.3   • Smokeless tobacco: Never Used   Substance and Sexual Activity   • Alcohol use: No   • Drug use: No   • Sexual activity: Defer           Objective   Physical Exam   Constitutional: She is oriented to person, place, and time. She appears well-developed and well-nourished. No distress.   HENT:   Head: Normocephalic and atraumatic.   Nose: Nose normal.   Eyes: Conjunctivae are normal. No scleral icterus.   Neck: Normal range of motion. Neck supple.   Cardiovascular: Normal rate, regular rhythm, normal heart sounds and intact distal pulses.   No murmur heard.  Pulmonary/Chest: Effort normal and breath sounds normal. No respiratory distress.   Abdominal: Soft. Bowel sounds are normal. There is no tenderness.   Musculoskeletal:   Left upper extremity is noted to be edematous.  Distal radial normal pulses are palpable and dopplerable.  Cap refill less than 2 seconds.  Sensation is intact.   Neurological: She is alert and oriented to person, place, and  time.   Skin: Skin is warm and dry. She is not diaphoretic.   Psychiatric: She has a normal mood and affect. Her behavior is normal.   Nursing note and vitals reviewed.      Procedures           ED Course  ED Course as of Dec 13 2245   Thu Dec 12, 2019   1644 Duplex Doppler was positive for clot in the axillary basilic and brachial radial veins.  CT angiogram was negative for pulmonary emboli.  Patient was started on Eliquis here should be given a prescription for such she is going to follow-up with her primary care physician.    [ELINA]   165 Request # : [id number]   JFEFRY query complete. Treatment plan to include limited course of prescribed  controlled substance. Risks including addiction, benefits, and alternatives presented to patient.      [ELINA]      ED Course User Index  [ELINA] Chaz Perales PA                      No data recorded                No results found for this or any previous visit (from the past 24 hour(s)).  Note: In addition to lab results from this visit, the labs listed above may include labs taken at another facility or during a different encounter within the last 24 hours. Please correlate lab times with ED admission and discharge times for further clarification of the services performed during this visit.    CT Angiogram Chest   Preliminary Result   1. No evidence of pulmonary embolus.   2. No evidence of other active chest disease.   3. Shotty left axillary lymph nodes and left upper extremity   subcutaneous edema is noted.       DICTATED:   12/12/2019   EDITED/ls :   12/12/2019                 Vitals:    12/12/19 1530 12/12/19 1630 12/12/19 1658 12/12/19 1700   BP: 116/79 124/69  114/76   Patient Position:       Pulse:   86    Resp:       Temp:       TempSrc:       SpO2: 97% 96%     Weight:       Height:         Medications   ibuprofen (ADVIL,MOTRIN) tablet 600 mg (600 mg Oral Given 12/12/19 1244)   iopamidol (ISOVUE-370) 76 % injection 100 mL (75 mL Intravenous Given 12/12/19 1452)    apixaban (ELIQUIS) tablet 10 mg (10 mg Oral Given 12/12/19 1391)     ECG/EMG Results (last 24 hours)     ** No results found for the last 24 hours. **        No orders to display               MDM  Number of Diagnoses or Management Options  Acute deep vein thrombosis (DVT) of axillary vein of left upper extremity (CMS/HCC): new and requires workup     Amount and/or Complexity of Data Reviewed  Clinical lab tests: reviewed and ordered  Tests in the radiology section of CPT®: ordered and reviewed  Tests in the medicine section of CPT®: ordered and reviewed  Discuss the patient with other providers: yes    Patient Progress  Patient progress: stable      Final diagnoses:   Acute deep vein thrombosis (DVT) of axillary vein of left upper extremity (CMS/HCC)              Chaz Perales PA  12/13/19 0795

## 2019-12-13 ENCOUNTER — TELEPHONE (OUTPATIENT)
Dept: ONCOLOGY | Facility: CLINIC | Age: 49
End: 2019-12-13

## 2019-12-13 NOTE — TELEPHONE ENCOUNTER
Pt encouraged to continue her Eliquis as prescribed, and offered an appointment with Dr Rubi so she can take over care and refills for this. Pt states she will be seeing her PCP, but if they end up wanting her to see a hematologist, she will make an appt with Dr Rubi.  Pt is scheduled to see ortho next week for an evaluation. Encouraged her to let them know that she is now on a blood thinner for an extensive blood clot in that affected arm, and that they will not want her to have surgery while she is on it.

## 2019-12-13 NOTE — TELEPHONE ENCOUNTER
Yesterday pt had CT Dr. Rubi ordered and the results should be in by today. Pt would like to get results.    Also pt reports that her left arm is hurting and swelling, so she went to the ER yesterday and they said she had a blood clot. She has not gotten those records yet but they should be in Epic because she came to our ER. She was prescribed eliquis 5mg once a day after doing double dose for 7 days.     Please give pt a call at [phone]

## 2019-12-17 ENCOUNTER — TELEPHONE (OUTPATIENT)
Dept: ONCOLOGY | Facility: CLINIC | Age: 49
End: 2019-12-17

## 2019-12-17 DIAGNOSIS — C50.912 RECURRENT CANCER OF LEFT BREAST (HCC): Primary | ICD-10-CM

## 2019-12-17 NOTE — TELEPHONE ENCOUNTER
Pt called to let Dr. Rubi know that she got her CT scan and bone scan rescheduled from January 3rd to this Monday the 23rd.

## 2019-12-17 NOTE — TELEPHONE ENCOUNTER
Called patient re: recent imaging.  She was diagnosed with an UE blood clot.  Now on blood thinners.  Ct imaging of the chest showed no PE, some mild axillary adenopathy, no other abnormalities.    Discussed with patient I am going to order ct a/p and bone scan to rule out cancer recurrence as a possible trigger for the DVT.  Will contact her as soon as results are available.

## 2019-12-18 ENCOUNTER — TELEPHONE (OUTPATIENT)
Dept: ONCOLOGY | Facility: CLINIC | Age: 49
End: 2019-12-18

## 2019-12-18 NOTE — TELEPHONE ENCOUNTER
"Pt called because she was recently in the ER and they did an ultrasound that showed \"shotty nodes\" under her left arm. She was under the impression that she no longer had any lymph nodes under that arm. Records [facility] in [facility]. Pt wants Elicia to go over records and give her a phone call to see what that is about.     She can be reached at [phone]  "

## 2019-12-18 NOTE — TELEPHONE ENCOUNTER
I discussed with Dr. Rubi and Dr. Rubi had discussed the results with patient yesterday but patient had more questions.  I explained that they do not remove all lymph nodes and that she has a thrombosis and swelling.  She said she could feel these areas and it frightened her.  I explained that the ct of chest did not show disease and that Dr. Rubi ordered a bone scan and CT of abd/pelvis that patient has scheduled for Monday, 12/23 at [facility] in [place].  I told patient we would follow up when we have results.  Patient verbalized understanding.

## 2019-12-23 DIAGNOSIS — C50.912 RECURRENT CANCER OF LEFT BREAST (HCC): ICD-10-CM

## 2019-12-24 ENCOUNTER — TELEPHONE (OUTPATIENT)
Dept: ONCOLOGY | Facility: CLINIC | Age: 49
End: 2019-12-24

## 2019-12-24 NOTE — TELEPHONE ENCOUNTER
Pt called to let us know that the results of her CT and bone scan have been faxed over to us. She would like a call with the results.    She can be reached at [phone].

## 2019-12-24 NOTE — TELEPHONE ENCOUNTER
Patient called today asking for results from ct and bone scans that were done at [facility] yesterday.  I asked BENJIE Hinojosa to review the scans and both ct and bone scan negative for disease.  I called patient and let her know the results.  Patient verbalized understanding.

## 2019-12-30 ENCOUNTER — TELEPHONE (OUTPATIENT)
Dept: ONCOLOGY | Facility: CLINIC | Age: 49
End: 2019-12-30

## 2019-12-30 DIAGNOSIS — C50.912 RECURRENT CANCER OF LEFT BREAST (HCC): Primary | ICD-10-CM

## 2019-12-30 NOTE — TELEPHONE ENCOUNTER
Pt received Dr. Rubi's message stating that her bone scan looked fine, so the pt just wants to know what her opinion is on the spot she saw on her sternum on the CT scan.      Please give her a call at [phone]

## 2019-12-31 NOTE — TELEPHONE ENCOUNTER
Dr. Rubi reviewed scans again and we will follow up with a CT scan of chest in a few months just to be certain patient does not have active disease.  Dr. Rubi believes the sternal spot may be scar tissue.  Pt has a normal CT of abdomen and pelvis and bone scan that was done at [facility].  I called patient to let her know the information and she will come to [place] to have the followup CT so they can compare it to December scan.  I sent note to scheduling and put order in. They will contact patient.

## 2020-01-16 ENCOUNTER — OFFICE VISIT (OUTPATIENT)
Dept: ONCOLOGY | Facility: CLINIC | Age: 50
End: 2020-01-16

## 2020-01-16 VITALS
OXYGEN SATURATION: 98 % | SYSTOLIC BLOOD PRESSURE: 123 MMHG | RESPIRATION RATE: 18 BRPM | BODY MASS INDEX: 30.12 KG/M2 | HEIGHT: 63 IN | TEMPERATURE: 98.7 F | DIASTOLIC BLOOD PRESSURE: 78 MMHG | WEIGHT: 170 LBS | HEART RATE: 75 BPM

## 2020-01-16 DIAGNOSIS — C50.912 RECURRENT CANCER OF LEFT BREAST (HCC): Primary | ICD-10-CM

## 2020-01-16 PROCEDURE — 99214 OFFICE O/P EST MOD 30 MIN: CPT | Performed by: INTERNAL MEDICINE

## 2020-01-16 NOTE — PROGRESS NOTES
PROBLEM LIST:  1. Stage II Left breast cancer, ER+ [place]+ Her2 negative.  A) bilateral mastectomy 5/2011.  Pathology showed 8 negative lymph nodes.  Adjuvant chemotherapy with TC.  Intolerant of tamoxifen, femara, and aromasin.  B) recurrence in left axillary lymph nodes - 3 or 4 LN involved, surgically removed 11/2014.  Treated with FEC x 5 cycles.  2. Anxiety  3. Cognitive impairment    Subjective      CC: breast cancer    HISTORY OF PRESENT ILLNESS:   Asuncion Flynn returns for follow-up.  Since her last visit she has been diagnosed with a left upper extremity DVT.  She has severe pain and swelling in this arm.  She did not respond after about a week of Eliquis in terms of her symptoms and was sent to [place].  There she was admitted to the hospital for IV heparin and discharged on Lovenox.  She said she felt better in the hospital but since discharge her swelling and pain have returned.  She tries to keep her arm elevated.  The Lovenox shots have been difficult for her.  She returns for follow-up at [place] next week.  Surgery for the clot has been discussed.  She is concerned about the risk of surgery given her prior axillary node dissection.    Past Medical History, Past Surgical History, Social History, Family History have been reviewed and are without significant changes except as mentioned.    Review of Systems   A comprehensive 14 point review of systems was performed and was negative except as mentioned.    Medications:  The current medication list was reviewed in the EMR    ALLERGIES:    Allergies   Allergen Reactions   • Ciprofloxacin Itching   • Codeine Hallucinations   • Erythromycin Itching   • Keflex [Cephalexin] Other (See Comments)     Caused lips to turn blue in color   • Morphine And Related Itching     Difficulty breathing   • Penicillins Itching   • Percocet [Oxycodone-Acetaminophen] Nausea And Vomiting   • Protonix [Pantoprazole Sodium] Other (See Comments)     nervous   • Sulfa  "Antibiotics Itching   • Fluticasone Anxiety and Tinnitus       Objective      /78 Comment: RUE  Pulse 75   Temp 98.7 °F (37.1 °C) (Temporal)   Resp 18   Ht 160 cm (63\")   Wt 77.1 kg (170 lb)   SpO2 98% Comment: RA  BMI 30.11 kg/m²      Performance Status: 0    General: well appearing female in no acute distress  Neuro: alert and oriented  HEENT: sclera anicteric, oropharynx clear  Lymphatics: no cervical, supraclavicular, or axillary adenopathy  Cardiovascular: regular rate and rhythm, no murmurs  Chest: Status post bilateral mastectomy with implant reconstruction.   Lungs: clear to auscultation bilaterally  Abdomen: soft, nontender, nondistended.  No palpable organomegaly  Extremeties: no lower extremity edema.  Significant swelling and tenderness of the left upper extremity from the hand up to the shoulder  Skin: no rashes, lesions, bruising, or petechiae  Psych: mood and affect appropriate          Assessment/Plan   Asuncion Flynn is a 50 y.o. year old female with a history of early stage ER positive HER-2 negative breast cancer who had a local recurrence now about 5 years ago.  She was treated with adjuvant chemotherapy and has not tolerated adjuvant hormonal therapy due to psychiatric disturbance.     When she developed the blood clot in her arm we did CT chest abdomen and pelvis as well as a bone scan.  These exams did not show evidence of metastatic disease but there were some mildly enlarged lymph nodes in the left axilla.  Given the uncertain trigger for this blood clot as well as concern about these lymph nodes I will order a PET scan for further evaluation.    DVT left upper extremity: Continue Lovenox injections.  She will contact me after her follow-up [place] next week.  I think if a procedure is indicated for treatment of this clot that it is reasonable to go ahead, as the risk of lymphedema at this point should not prohibit her from having any needed procedures.    Follow-up as " scheduled or sooner as needed.  I will contact her with the PET scan results.        I spent 25 minutes with the patient. I spent > 50% percent of this time counseling and discussing prognosis, diagnostic testing, evaluation, current status and management.      Arcelia Rubi MD  [facility]ematology [facility] Oncology    1/16/2020          CC:

## 2020-01-22 ENCOUNTER — HOSPITAL ENCOUNTER (OUTPATIENT)
Dept: PET IMAGING | Facility: HOSPITAL | Age: 50
End: 2020-01-22

## 2020-01-22 ENCOUNTER — APPOINTMENT (OUTPATIENT)
Dept: PET IMAGING | Facility: HOSPITAL | Age: 50
End: 2020-01-22

## 2020-01-27 ENCOUNTER — HOSPITAL ENCOUNTER (OUTPATIENT)
Dept: PET IMAGING | Facility: HOSPITAL | Age: 50
Discharge: HOME | End: 2020-01-27

## 2020-01-27 ENCOUNTER — HOSPITAL ENCOUNTER (OUTPATIENT)
Dept: PET IMAGING | Facility: HOSPITAL | Age: 50
Discharge: HOME | End: 2020-01-27
Admitting: INTERNAL MEDICINE

## 2020-01-27 ENCOUNTER — TELEPHONE (OUTPATIENT)
Dept: ONCOLOGY | Facility: CLINIC | Age: 50
End: 2020-01-27

## 2020-01-27 DIAGNOSIS — C50.912 RECURRENT CANCER OF LEFT BREAST (HCC): ICD-10-CM

## 2020-01-27 LAB — GLUCOSE BLDC GLUCOMTR-MCNC: 107 MG/DL (ref 70–130)

## 2020-01-27 PROCEDURE — 82962 GLUCOSE BLOOD TEST: CPT

## 2020-01-27 PROCEDURE — 78815 PET IMAGE W/CT SKULL-THIGH: CPT

## 2020-01-27 PROCEDURE — 0 FLUDEOXYGLUCOSE F18 SOLUTION: Performed by: INTERNAL MEDICINE

## 2020-01-27 PROCEDURE — A9552 F18 FDG: HCPCS | Performed by: INTERNAL MEDICINE

## 2020-01-27 RX ADMIN — FLUDEOXYGLUCOSE F18 1 DOSE: 300 INJECTION INTRAVENOUS at 09:47

## 2020-01-28 DIAGNOSIS — C50.912 RECURRENT CANCER OF LEFT BREAST (HCC): Primary | ICD-10-CM

## 2020-01-28 NOTE — PROGRESS NOTES
Called patient to let her know that PET scan does show hypermetabolic activity in left axilla, worrisome for malignancy.    She reports that she had surgery for clot lysis in the left arm last week at [facility].  So far still with significant pain and swelling, and has some numbness in her fingers.  Was told that the clot wasn't as bad as anticipated and it seemed to be more lymphedema in the arm.    Will arrange for ultrasound and biopsy of axillary LN.  She is on lovenox which can be briefly held prior to biopsy.    Will see her back following to review results.

## 2020-01-29 ENCOUNTER — TELEPHONE (OUTPATIENT)
Dept: ONCOLOGY | Facility: CLINIC | Age: 50
End: 2020-01-29

## 2020-01-29 NOTE — TELEPHONE ENCOUNTER
Patient said Dr. Rubi called with PET scan results yesterday.  She has not heard about an appointment for biopsies. When will that be set up?  Also-wants to be sure the lab results have been faxed to Dr. Sahni at [facility] so they can decide what blood thinner to put her on.  [phone]-Phone # -Dr. Sahni    Patient call back # [phone]

## 2020-01-29 NOTE — TELEPHONE ENCOUNTER
I called patient to let her know that [facility] can see her results as they use the same computer system that we use.  Patient is very concerned that she wont be able to do the  Ultrasound and biopsy because she can't hardly move her arm due to shoulder tears.  She states that she uses a pillow to prop her arm up.  She is tearful and scared and worried about the ultrasound and biopsy.  I talked with Dr Rubi and she stated that the patient could premed with pain medication prior to the ultrasound.  Patient has some dilaudid and tramadol.  Patient was satisfied with that idea.

## 2020-02-03 ENCOUNTER — TELEPHONE (OUTPATIENT)
Dept: ONCOLOGY | Facility: CLINIC | Age: 50
End: 2020-02-03

## 2020-02-03 NOTE — TELEPHONE ENCOUNTER
Patient called from her PCP office and they need her PET scan results. Fax to [phone].    Patient also needs for someone to call her to setup her biopsy appointment.     Her phone # [phone].

## 2020-02-03 NOTE — TELEPHONE ENCOUNTER
I faxed the PET results to the patient pcp at [phone] and also I left the patient a msg that scheduling is trying to reach her to set up the biopsy.  I left patient a detailed msg asking her to call [phone] to schedule the biopsy.  Dr. Rubi had talked to [facility] today.

## 2020-02-04 ENCOUNTER — TELEPHONE (OUTPATIENT)
Dept: ONCOLOGY | Facility: CLINIC | Age: 50
End: 2020-02-04

## 2020-02-04 DIAGNOSIS — C50.912 RECURRENT CANCER OF LEFT BREAST (HCC): Primary | ICD-10-CM

## 2020-02-04 RX ORDER — ALPRAZOLAM 0.5 MG/1
TABLET ORAL
Qty: 2 TABLET | Refills: 0 | Status: SHIPPED | OUTPATIENT
Start: 2020-02-04 | End: 2020-03-03

## 2020-02-04 RX ORDER — OXYCODONE HYDROCHLORIDE AND ACETAMINOPHEN 5; 325 MG/1; MG/1
TABLET ORAL
Qty: 5 TABLET | Refills: 0 | Status: SHIPPED | OUTPATIENT
Start: 2020-02-04 | End: 2020-02-13 | Stop reason: SINTOL

## 2020-02-04 NOTE — TELEPHONE ENCOUNTER
Patient called to ask about her eliquis and if she should stop it for the biopsy procedure.  Dr. khoury called and spoke to radiologist and they said since it is a FNA and patient had recent clot, she can continue to take the eliquis twice a day as ordered.  I called patient and let her know and also instructed her that if the adenopathy is not superficial enough, the radiologist may not be able to do the procedure.  Pt understood but is hopeful the biopsy can be done as she is anxious about it and wants it over.

## 2020-02-04 NOTE — TELEPHONE ENCOUNTER
Pt called to let us know that she has her ultrasound and any associated biopsies scheduled for this upcoming Thursday. She needs a prescription for something to take that morning to help her to be able to move her shoulder better for this procedure. She has tramadol and ativan but she isn't sure that those will help with the movement itself.     Please send any prescriptions to pharmacy on file.     Pt can be reached at

## 2020-02-04 NOTE — TELEPHONE ENCOUNTER
I talked with Dr. Rubi and she stated we could order percocet 5/325 #5 and that patient could take one about 20-30 minutes prior to the procedure.  Also, she ordered xanax 0.5mg tablets #2 and patient can take these if she feels the percocet does not help her with arm movement.  I spoke to the patient and instructed her on how to take the meds prior to the procedure and she verbalized understanding.  I confirmed that the biopsy was scheduled for Thursday and I confirmed that her pharmacy was [facility].

## 2020-02-06 ENCOUNTER — HOSPITAL ENCOUNTER (OUTPATIENT)
Dept: ULTRASOUND IMAGING | Facility: HOSPITAL | Age: 50
Discharge: HOME | End: 2020-02-06

## 2020-02-06 DIAGNOSIS — C50.912 RECURRENT CANCER OF LEFT BREAST (HCC): ICD-10-CM

## 2020-02-07 ENCOUNTER — TELEPHONE (OUTPATIENT)
Dept: ONCOLOGY | Facility: CLINIC | Age: 50
End: 2020-02-07

## 2020-02-07 ENCOUNTER — TELEPHONE (OUTPATIENT)
Dept: ONCOLOGY | Facility: OTHER | Age: 50
End: 2020-02-07

## 2020-02-07 NOTE — TELEPHONE ENCOUNTER
Arcelia Rubi,  Pt    Pt called about she was not able to get her  ULTRASOUND done because she could not lift her left arm high enough. Pt wants a nurse to call her back about this asap @ [phone].   Thanks

## 2020-02-07 NOTE — TELEPHONE ENCOUNTER
I spoke with Dr. Rubi and she will be calling patient later today.  Pt will wait to hear from Dr. Ruib.

## 2020-02-07 NOTE — TELEPHONE ENCOUNTER
Called patient re: yesterday's attempted biopsy.  She is unable to move or position her arm to make this possible.  I don't think a surgical biposy is worth the significant risk at this point given recent blood clot, anticoagulation, severe lymphedema.    Given her prior axillary recurrence and appearance of imaging, I think this is most likely recurrent ER+ breast cancer.  Would recommend starting endocrine therapy - either with anastrozole or fulvestrant.  She will need mental health involvement given her prior history of psychiatric disturbance with endocrine agents.    She says she has never received radiation treamtent.  This could also help with local control.  Discussed with  Dr. Denton - will need early involvement of PT/OT for lymphedema and range of motion management, but radiation should be considered.    Will meet with Asuncion on Tuesday to further discuss.

## 2020-02-11 ENCOUNTER — OFFICE VISIT (OUTPATIENT)
Dept: ONCOLOGY | Facility: CLINIC | Age: 50
End: 2020-02-11

## 2020-02-11 VITALS
RESPIRATION RATE: 12 BRPM | OXYGEN SATURATION: 98 % | BODY MASS INDEX: 28.88 KG/M2 | DIASTOLIC BLOOD PRESSURE: 82 MMHG | WEIGHT: 163 LBS | TEMPERATURE: 98.2 F | SYSTOLIC BLOOD PRESSURE: 127 MMHG | HEIGHT: 63 IN | HEART RATE: 94 BPM

## 2020-02-11 DIAGNOSIS — C50.912 RECURRENT CANCER OF LEFT BREAST (HCC): Primary | ICD-10-CM

## 2020-02-11 PROCEDURE — 99214 OFFICE O/P EST MOD 30 MIN: CPT | Performed by: INTERNAL MEDICINE

## 2020-02-11 RX ORDER — LORAZEPAM 0.5 MG/1
0.5 TABLET ORAL
COMMUNITY
Start: 2020-01-14

## 2020-02-11 RX ORDER — GABAPENTIN 100 MG/1
CAPSULE ORAL
COMMUNITY
Start: 2020-01-27 | End: 2020-02-11

## 2020-02-11 RX ORDER — NALOXONE HYDROCHLORIDE 4 MG/.1ML
SPRAY NASAL
COMMUNITY
Start: 2020-01-26 | End: 2020-02-11

## 2020-02-11 RX ORDER — GABAPENTIN 300 MG/1
300 CAPSULE ORAL 3 TIMES DAILY
COMMUNITY
Start: 2020-01-26 | End: 2020-02-11

## 2020-02-11 NOTE — PROGRESS NOTES
PROBLEM LIST:  1. Stage II Left breast cancer, ER+ DC+ Her2 negative.  A) bilateral mastectomy 5/2011.  Pathology showed 8 negative lymph nodes.  Adjuvant chemotherapy with TC.  Intolerant of tamoxifen, femara, and aromasin.  B) recurrence in left axillary lymph nodes - 3 or 4 LN involved, surgically removed 11/2014.  Treated with FEC x 5 cycles.  C) presented with left arm swelling in December 2019.  She was diagnosed with left upper extremity DVT.  CT chest 12/12/2019 showed no evidence of pulmonary embolism, but shotty left axillary lymph nodes.  PET scan 1/27/2020 showed hypermetabolic activity in the left axillary lymph nodes with no other evidence of disease.  2. Anxiety  3. Cognitive impairment    Subjective      CC: breast cancer    HISTORY OF PRESENT ILLNESS:   Asuncion Flynn returns for follow-up.  She continues to have severe swelling and discomfort in the left arm.  The swelling has not decreased at all since the initial onset.  She continues to take Eliquis.  She attempted to have an axillary lymph node biopsy but is not able to position her arm due to discomfort.    Past Medical History, Past Surgical History, Social History, Family History have been reviewed and are without significant changes except as mentioned.    Review of Systems   A comprehensive 14 point review of systems was performed and was negative except as mentioned.    Medications:  The current medication list was reviewed in the EMR    ALLERGIES:    Allergies   Allergen Reactions   • Keflex [Cephalexin] Unknown - High Severity     Caused lips to turn blue in color   • Morphine And Related Shortness Of Breath and Itching     Difficulty breathing   • Ciprofloxacin Itching   • Codeine Hallucinations   • Erythromycin Itching   • Penicillins Itching   • Percocet [Oxycodone-Acetaminophen] Nausea And Vomiting   • Sulfa Antibiotics Itching   • Fluticasone Anxiety and Tinnitus   • Protonix [Pantoprazole Sodium] Anxiety     nervous  "      Objective      /82   Pulse 94   Temp 98.2 °F (36.8 °C) (Temporal)   Resp 12   Ht 160 cm (63\")   Wt 73.9 kg (163 lb)   SpO2 98%   BMI 28.87 kg/m²      Performance Status: 0    General: well appearing female in no acute distress  Neuro: alert and oriented  HEENT: sclera anicteric, oropharynx clear  Lymphatics: no cervical, supraclavicular, or axillary adenopathy  Cardiovascular: regular rate and rhythm, no murmurs  Chest: Status post bilateral mastectomy with implant reconstruction.  Dilated blood vessels on the left chest wall  Lungs: clear to auscultation bilaterally  Abdomen: soft, nontender, nondistended.  No palpable organomegaly  Extremeties: no lower extremity edema.  Significant swelling and tenderness of the left upper extremity from the hand up to the shoulder  Skin: no rashes, lesions, bruising, or petechiae  Psych: mood and affect appropriate    NM Pet Skull Base To Mid Thigh  Narrative: EXAMINATION: NM PET SKULL BASE TO MID THIGH-     INDICATION: Left axillary adenopathy, history of breast cancer;  C50.912-Malignant neoplasm of unspecified site of left female breast.     TECHNIQUE: With fasting blood glucose level of 107 mg/dL, a total of  13.13 mCi of FDG was administered via the right antecubital vein.  Following appropriate delay, PET CT imaging was obtained from the skull  vertex to the mid thighs bilaterally and fused multiplanar images are  reconstructed. The CT scan is an unenhanced study and used for reference  to the PET scan only and should not be considered a diagnostic CT scan.  PET CT imaging was reviewed in the axial, coronal, and sagittal planes  as well as within the cine mode.     COMPARISON: Subsequent exam for treatment with prior study available  from 04/07/2014.     FINDINGS: There is normal variant activity identified within the  oropharynx and muscles of phonation. Normal variant activity is  identified in the region of the vocal cords. There is no " hypermetabolic  activity identified within the neck to suggest evidence of metastatic  disease. Normal variant activity identified within the heart. There is  extensive hypermetabolic activity identified in the left axillary region  with some surrounding stranding. The hypermetabolic activity has maximum  SUV of 8.3 all suggestive of metastatic disease. The remainder of the  chest is unremarkable. No other hypermetabolic focus is identified.  Within the abdomen and pelvis there is normal variant activity  identified within the GI and  tract. No hypermetabolic focus to  suggest evidence of metastatic disease. Upper thighs are unremarkable.     Impression: Abnormal hypermetabolic activity to suggest malignancy  within the left axillary region correlating to adenopathy as well as  possible postsurgical change and stranding in the soft tissues. No  distant metastatic disease is identified.      D:  01/27/2020  E:  01/28/2020     This report was finalized on 1/29/2020 9:34 AM by Dr. Edwige Mast MD.       I personally reviewed the images      Assessment/Plan   Asuncion Flynn is a 50 y.o. year old female with a history of early stage ER positive HER-2 negative breast cancer who had a local recurrence now about 5 years ago.  She now unfortunately has what appears to be a second to axillary lymph node recurrence in the left axilla.  She additionally had a left upper extremity DVT and has developed significant lymphedema which is very symptomatic.    Probable recurrent metastatic breast cancer: She previously had estrogen receptor positive disease.  Ideally would have a biopsy to prove recurrence as well as recheck the receptor status, but this is not possible at this time.  I think surgical excision would be extremely risky given the extent of her lymphedema as well as her current anticoagulation.  I would recommend starting endocrine therapy.  We discussed treatment with anastrozole.  Because she has had  significant psychiatric side effects from endocrine treatment previously, I will refer her to see Monica Ivey prior to starting treatment.  I will also at some point plan to refer her to see Dr. Denton to discuss radiation treatment to the axilla, as she has never received radiation previously.  I think this will need to wait until she has more mobility of the arm.    Lymphedema: She has previously been scheduled to see occupational therapy but we will move this appointment up as soon as possible.    DVT left upper extremity: Continue Eliquis.  She will likely need to continue this long-term.    Follow-up with me as scheduled next week.        I spent 25 minutes with the patient. I spent > 50% percent of this time counseling and discussing prognosis, diagnostic testing, evaluation, current status and management.      Arcelia Rubi MD  [facility]    2/11/2020          CC:

## 2020-02-13 ENCOUNTER — TELEPHONE (OUTPATIENT)
Dept: ONCOLOGY | Facility: CLINIC | Age: 50
End: 2020-02-13

## 2020-02-13 ENCOUNTER — OFFICE VISIT (OUTPATIENT)
Dept: PSYCHIATRY | Facility: CLINIC | Age: 50
End: 2020-02-13

## 2020-02-13 ENCOUNTER — HOSPITAL ENCOUNTER (OUTPATIENT)
Dept: OCCUPATIONAL THERAPY | Facility: HOSPITAL | Age: 50
Setting detail: THERAPIES SERIES
Discharge: HOME | End: 2020-02-13

## 2020-02-13 DIAGNOSIS — I89.0 LYMPHEDEMA OF LEFT UPPER EXTREMITY: Primary | ICD-10-CM

## 2020-02-13 DIAGNOSIS — M25.60 RANGE OF MOTION DEFICIT: ICD-10-CM

## 2020-02-13 DIAGNOSIS — F33.1 MODERATE EPISODE OF RECURRENT MAJOR DEPRESSIVE DISORDER (HCC): ICD-10-CM

## 2020-02-13 DIAGNOSIS — F41.1 GENERALIZED ANXIETY DISORDER: Primary | ICD-10-CM

## 2020-02-13 DIAGNOSIS — I82.A12 ACUTE DEEP VEIN THROMBOSIS (DVT) OF AXILLARY VEIN OF LEFT UPPER EXTREMITY (HCC): ICD-10-CM

## 2020-02-13 DIAGNOSIS — G47.01 INSOMNIA DUE TO MEDICAL CONDITION: ICD-10-CM

## 2020-02-13 DIAGNOSIS — M79.602 PAIN IN LEFT ARM: ICD-10-CM

## 2020-02-13 PROCEDURE — 90792 PSYCH DIAG EVAL W/MED SRVCS: CPT | Performed by: NURSE PRACTITIONER

## 2020-02-13 PROCEDURE — 97140 MANUAL THERAPY 1/> REGIONS: CPT

## 2020-02-13 PROCEDURE — 97110 THERAPEUTIC EXERCISES: CPT

## 2020-02-13 PROCEDURE — 97166 OT EVAL MOD COMPLEX 45 MIN: CPT

## 2020-02-13 PROCEDURE — 97535 SELF CARE MNGMENT TRAINING: CPT

## 2020-02-13 RX ORDER — TRAMADOL HYDROCHLORIDE 50 MG/1
50 TABLET ORAL EVERY 4 HOURS PRN
Qty: 60 TABLET | Refills: 0 | OUTPATIENT
Start: 2020-02-13

## 2020-02-13 NOTE — TELEPHONE ENCOUNTER
Pt missed a call, no message was left regarding what the call was about. Pt believes it was about her traMADol prescription and wants to know the status.. Also, pt has question about starting a chemo pill used for hormone blocking. Please call pt back at [phone] in regards to these concerns.

## 2020-02-13 NOTE — TELEPHONE ENCOUNTER
Let her know I called the tramadol in per Dr.. Rubi and also we would start the anastrozole after patient sees Dr. Rubi on the 19th.  Patient verbalized understandin .

## 2020-02-13 NOTE — PROGRESS NOTES
"  Subjective   Asuncion Flynn is a 50 y.o.  female who is here today for initial appointment. Patient was referred by: Dr. Rubi for anxiety, fear. Patient has probable second left axillary node recurrence from breast cancer. She has had a clot in left axilla as well and now lymphedema. She reports rotator cuff tare as well so has decreased range of motion currently. She lives in [place], [place] with her 12yo son Bri. She has support from her mother. Patient is on short term disability currently since December 2019 because of blood clot left arm, now painful left arm and lymphedema.     Chief Complaint:  Anxious, not sleeping, pain in left shoulder and arm    History of Present IllnessPatient presents with her mother for session. The patient reports the following symptoms of generalized anxiety: constant anxiety/worry, restlessness/on edge, difficulty concentrating, mind goes blank, irritability, muscle tension and sleep disturbance. The symptoms have been present for at least 5 month(s) and have caused impairment in important areas of functioning. The patient reports depressive symptoms including depressed mood, crying spells, insomnia, anhedonia, feelings of guilt, feelings of helplessness, feelings of worthlessness, low energy, difficulty concentrating and psychomotor agitation, present on most days for the past 4 month(s)  and have caused impairment in important areas of functioning. Depression rated 8/10 with 10 being the worst. She denies SI/HI or AVH. She denies alcohol or illicit drug use. She has fear of medications because of side effects or intolerance. Patient was diagnosed with breast cancer 2011 and had bilateral mastectomies and chemotherapy. She was intolerant to aromatase inhibitors \"mental health effects\" felt like I was going to die from them. She had left axillary recurrence 5 years ago and nodes were removed. Then in November 2019 she had blood clot in left arm and PET SCAN showed area in " left axilla, unable to do biopsy because of decreased ROM, pt stating she had rotator tare and now lymphedema. She sees OT today first appt. She saw Dr. Rubi and plan is to try again getting her on an aromatase inhibitor for 5-7 years. Patient is very nervous about this she says because she felt bad on it. Radiation therapy to local area may be planned, but ROM has to be attainable to do radiation to axilla. Patient reports anxiety her adult life. Her   and since then she has struggled more and caring for her son who has mental health issues.     The following portions of the patient's history were reviewed and updated as appropriate: allergies, current medications, past family history, past medical history, past social history, past surgical history and problem list.      Past Psych History: anxiety, has been on Lexapro 2.5mg po daily x years she reports then increased to 5mg a few weeks ago. She is on tramodol 50mg for chronic pain she rates a 10 at times and tramadol brings it down to 5-6. She can't sleep because of pain and anxiety. She has ativan through her PCP but worries about dependence. It does help her stop racing thoughts at bedtime. Denies suicidal thoughts or attempts, denies therapy, denies inpatient psychiatric admissions.     Substance Abuse: denies all       ABUSE HX: denies   LEGAL HX: denies      Family Psychiatric History:  family history includes Cancer in her father, maternal grandfather, and paternal grandmother; Dementia in her maternal grandmother; Depression in her mother; Diabetes in her maternal aunt and paternal grandmother; Heart attack in her paternal grandfather; Heart disease in her father and maternal grandmother; Hypertension in her mother; Kidney disease in her paternal grandmother; Mental illness in her maternal aunt and mother; Multiple sclerosis in her maternal grandfather, maternal uncle, and paternal aunt; Parkinsonism in her maternal aunt; Stroke in her  maternal grandmother.      Social History:       Medical/Surgical History:  Past Medical History:   Diagnosis Date   • Anxiety associated with depression    • Breast cancer (CMS/HCC)    • Cancer (CMS/HCC)     hx of breast cancer x2   • Depression    • Drug therapy    • H/O mastectomy    • Hearing difficulty     Bilaterally. Recurrent ear infections as well as allergies and fluid in her ears.Not progressive.    • Hyperlipidemia    • Hypothyroidism    • Migraines 04/2019    complex   • Multiple allergies    • Panic disorder    • Twitching     4/7/2016: Little bit of twitching in the left upper extremity that began about 3 months or so ago, again it is not progressive in the slightest but it is noticeable.Left upper extremity movement disorder, non-progressive, could be related to anxiety.    • Vitamin D deficiency      Past Surgical History:   Procedure Laterality Date   • AUGMENTATION MAMMAPLASTY  2011    reconstruction   • BREAST BIOPSY  2011   • CARDIOVASCULAR STRESS TEST  02/02/2017    [facility]- 6 min 31 sec, 93% THR, 162/70, negative for ischemia   • ECHO - CONVERTED  12/04/2013    @[facility]. . EF 65%.   • ECHO - CONVERTED  02/02/2017    EF 60%, normal diastolic function, mild MR   • ECHO - CONVERTED  03/25/2019    ([facility]) Echo with bubble- EF 60%, no shunt, RVSP 15 mmHg    • MASTECTOMY Bilateral 05/2011   • OTHER SURGICAL HISTORY  03/25/2019    MRI brain- no infarct, sinusitis   • OTHER SURGICAL HISTORY  03/25/2019    CT head- no infarct   • OTHER SURGICAL HISTORY  03/23/2019    CTA head and neck- normal        Allergies   Allergen Reactions   • Keflex [Cephalexin] Unknown - High Severity     Caused lips to turn blue in color   • Morphine And Related Shortness Of Breath and Itching     Difficulty breathing   • Ciprofloxacin Itching   • Codeine Hallucinations   • Erythromycin Itching   • Penicillins Itching   • Percocet [Oxycodone-Acetaminophen] Nausea And Vomiting   • Sulfa Antibiotics Itching   • Fluticasone  Anxiety and Tinnitus   • Protonix [Pantoprazole Sodium] Anxiety     nervous       Current Medications:   Current Outpatient Medications   Medication Sig Dispense Refill   • acetaminophen (TYLENOL) 500 MG tablet Take 500 mg by mouth Every 6 (Six) Hours As Needed for Mild Pain .     • ALPRAZolam (XANAX) 0.5 MG tablet Take prior to procedure if needed for anxiety 2 tablet 0   • apixaban (ELIQUIS) 5 MG tablet tablet Take 5 mg by mouth Every 12 (Twelve) Hours.     • escitalopram (LEXAPRO) 10 MG tablet Take 2.5 mg by mouth Every Other Day.     • loratadine (CLARITIN) 10 MG tablet Take 10 mg by mouth Daily.     • LORazepam (ATIVAN) 0.5 MG tablet 0.5 mg. 1/2 tab prn     • meclizine (ANTIVERT) 12.5 MG tablet Take 12.5 mg by mouth 3 (Three) Times a Day As Needed for dizziness.     • omeprazole (priLOSEC) 20 MG capsule Take 20 mg by mouth As Needed.     • ondansetron ODT (ZOFRAN-ODT) 4 MG disintegrating tablet Take 1 tablet by mouth Every 8 (Eight) Hours As Needed for Nausea. 12 tablet 0   • traMADol (ULTRAM) 50 MG tablet Take 1 tablet by mouth Every 4 (Four) Hours As Needed for Moderate Pain . 15 tablet 0     No current facility-administered medications for this visit.        Lab Results: reviewed in [facility]         Review of Systems Constitutional: tired looking, pain left arm has pillow to support it with lymphedema  HENT: Negative for hearing loss, sore throat, trouble swallowing and voice change.    Eyes: Negative for photophobia and visual disturbance.   Respiratory: Negative for cough, chest tightness and shortness of breath.    Cardiovascular: Negative for chest pain and palpitations. HAS BILATERAL LOWER EXTREMITY EDEMA  Gastrointestinal: Negative for abdominal pain, constipation, nausea and vomiting.   Endocrine: Negative for cold intolerance and heat intolerance.   Genitourinary: Negative for dysuria and frequency.   Musculoskeletal: Negative for arthralgia, back pain, joint swelling and neck stiffness. PAIN LEFT  SHOULDER AND LYMPHEDEMA  Skin: Negative for color change and wound.   Allergic/Immunologic: Negative for environmental allergies and immunocompromised state.   Neurological: Negative for dizziness, tremors, seizures, syncope, weakness, light-headedness and headaches.   Hematological: Negative for adenopathy. Does not bruise/bleed easily.    Objective   Physical Exam  There were no vitals taken for this visit.    MARY KATE-7:    Over the last two weeks, how often have you been bothered by the following problems?  Feeling nervous, anxious or on edge: Nearly every day  Not being able to stop or control worrying: Nearly every day  Worrying too much about different things: Nearly every day  Trouble Relaxing: Nearly every day  Being so restless that it is hard to sit still: Nearly every day  Becoming easily annoyed or irritable: Nearly every day  Feeling afraid as if something awful might happen: Nearly every day  MARY KATE 7 Total Score: 21  If you checked any problems, how difficult have these problems made it for you to do your work, take care of things at home, or get along with other people: Extremely difficult  0-4: Minimal anxiety  5-9: Mild anxiety  10-14: Moderate anxiety  15-21: Severe anxiety    PHQ-9:  PHQ-2/PHQ-9 Depression Screening 2/13/2020   Little interest or pleasure in doing things 3   Feeling down, depressed, or hopeless 3   Trouble falling or staying asleep, or sleeping too much 3   Feeling tired or having little energy 3   Poor appetite or overeating 1   Feeling bad about yourself - or that you are a failure or have let yourself or your family down 3   Trouble concentrating on things, such as reading the newspaper or watching television 3   Moving or speaking so slowly that other people could have noticed. Or the opposite - being so fidgety or restless that you have been moving around a lot more than usual 3   Thoughts that you would be better off dead, or of hurting yourself in some way 0   Total Score 22   If  you checked off any problems, how difficult have these problems made it for you to do your work, take care of things at home, or get along with other people? Extremely dIfficult      5-9: Minimal symptoms  10-14: Major depression mild  15-19: Major depression moderate  Greater then 20: Major depression severe    Mental Status Exam:   Appearance: appropriate  Hygiene:   good  Cooperation:  Cooperative  Eye Contact:  Good  Psychomotor Behavior:  Appropriate  Mood:  anxious and depressed  Affect:  Appropriate  Hopelessness: Denies  Speech:  Normal  Thought Process:  Linear  Thought Content:  Normal  Suicidal:  None  Homicidal:  None  Hallucinations:  None  Delusion:  None  Memory:  Intact  Orientation:  Person, Place, Time and Situation  Reliability:  good  Insight:  Fair  Judgement:  Fair  Impulse Control:  Fair  Physical/Medical Issues:  Yes probable breast cancer recurrence left axilla       Short-term goals: Patient will be compliant with clinic appointments.  Patient will be engaged in therapy, medication compliant with minimal side effects. Patient  will report decrease of symptoms and frequency.    Long-term goals: Patient will have minimal symptoms of mental health disorder with continued treatment. Patient will be compliant with treatment and appointments.       Problem list: anxiety, chronic pain, depression, insomnia related to pain and anxiety   Strengths: patient appears motivated for treatment          Assessment/Plan   Diagnoses and all orders for this visit:    Generalized anxiety disorder    Moderate episode of recurrent major depressive disorder (CMS/HCC)    Insomnia due to medical condition    Chronic pain since November 2019 left shoulder now arm as well.    A psychological evaluation was conducted in order to assess past and current level of functioning. Areas assessed included, but were not limited to: perception of social support, perception of ability to face and deal with challenges in life  (positive functioning), anxiety symptoms, depressive symptoms, perspective on beliefs/belief system, coping skills for stress, intelligence level,  Therapeutic rapport was established. Interventions conducted today were geared towards incorporating medication management along with support for continued therapy. Education was also provided as to the med management with this provider and what to expect in subsequent sessions.    Assisted patient in processing above session content; acknowledged and normalized patient’s thoughts, feelings, and concerns.  Applied  positive coping skills and behavior management in session.  Allowed patient to freely discuss issues without interruption or judgment. Provided safe, confidential environment to facilitate the development of positive therapeutic relationship and encourage open, honest communication. Assisted patient in identifying risk factors which would indicate the need for higher level of care including thoughts to harm self or others and/or self-harming behavior and encouraged patient to contact this office, call 911, or present to the nearest emergency room should any of these events occur. Discussed crisis intervention services and means to access.  Patient adamantly and convincingly denies current suicidal or homicidal ideation or perceptual disturbance.    Discussed diagnosis and recommendations for treatment:  1. Discussed with Dr. Rubi to make referral to Palliative Care for pain  2. Dr. Rubi will refill tramadol she is on for now until she can be seen by palliative care  3. Pt has an ativan prescription and uses at bedtime to help relax and decrease racing thoughts,, this was ordered through her PCP  4. Cont Lexapro, she just increased from 2.5mg she had been on for years to 5mg a few weeks ago, will monitor and if need either change or go up if she tolerates this, she has fear of medications with     past side effects of meds    Once has better sleep and  emotional stability will work on mindfulness, meditation   5. PROVIDE: Cognitive Behavioral Therapy and Solution Focused Therapy to improve functioning, maintain stability, and avoid decompensation and the need for higher level of care.  6. Discussed last time she tried aromatase inhibitor she had just finished chemotherapy which wasn't easy on her she states. She will be taking med years after chemotherapy so may tolerate better.     We discussed risks, benefits,goals and side effects of the above medication and the patient was agreeable with the plan.Patient was educated on the importance of compliance with treatment and follow-up appointments.To call for questions or concerns and return early if necessary. Crisis plan reviewed including going to the Emergency department.       Treatment Plan: stabilize mood,  patient will stay out of the hospital and be at optimal level of functioning, take all medication as prescribed. Patient verbalized  understanding and agreement to plan.      Return in about 3 weeks (around 3/5/2020).

## 2020-02-14 ENCOUNTER — TELEPHONE (OUTPATIENT)
Dept: ONCOLOGY | Facility: OTHER | Age: 50
End: 2020-02-14

## 2020-02-14 NOTE — TELEPHONE ENCOUNTER
Arcelia Rubi MD Dr Schell pt call about med:   traMADol (ULTRAM) 50 MG tablet    Pt states [facility] is needing a Pre Auth for this medication before they can fill it.     Please call pt regarding this matter @   [phone]     Thanks

## 2020-02-14 NOTE — TELEPHONE ENCOUNTER
Kimberly PARIKH MA got the prior auth and I called patient to let her know.  Patient will contact pharmacy.

## 2020-02-14 NOTE — THERAPY EVALUATION
Outpatient Occupational Therapy Lymphedema Initial Evaluation  [facility]     Patient Name: Asuncion Flynn  : 1970  MRN: [id number]  Today's Date: 2020      Visit Date: 2020    Patient Active Problem List   Diagnosis   • Recurrent cancer of left breast (CMS/HCC)   • Chronic anxiety   • Lymphedema of left upper extremity   • Memory loss   • Periodic headache syndrome, not intractable   • Palpitations   • Chest pain, atypical   • TIA (transient ischemic attack)   • Dyspnea on exertion   • Hyperlipidemia LDL goal <100        Past Medical History:   Diagnosis Date   • Anxiety associated with depression    • Breast cancer (CMS/HCC)    • Cancer (CMS/HCC)     hx of breast cancer x2   • Depression    • Drug therapy    • H/O mastectomy    • Hearing difficulty     Bilaterally. Recurrent ear infections as well as allergies and fluid in her ears.Not progressive.    • Hyperlipidemia    • Hypothyroidism    • Migraines 2019    complex   • Multiple allergies    • Panic disorder    • Twitching     2016: Little bit of twitching in the left upper extremity that began about 3 months or so ago, again it is not progressive in the slightest but it is noticeable.Left upper extremity movement disorder, non-progressive, could be related to anxiety.    • Vitamin D deficiency         Past Surgical History:   Procedure Laterality Date   • AUGMENTATION MAMMAPLASTY      reconstruction   • BREAST BIOPSY     • CARDIOVASCULAR STRESS TEST  2017    [facility]- 6 min 31 sec, 93% THR, 162/70, negative for ischemia   • ECHO - CONVERTED  2013    @[facility]. . EF 65%.   • ECHO - CONVERTED  2017    EF 60%, normal diastolic function, mild MR   • ECHO - CONVERTED  2019    ([facility]) Echo with bubble- EF 60%, no shunt, RVSP 15 mmHg    • MASTECTOMY Bilateral 2011   • OTHER SURGICAL HISTORY  2019    MRI brain- no infarct, sinusitis   • OTHER SURGICAL HISTORY  2019    CT head- no infarct   •  OTHER SURGICAL HISTORY  03/23/2019    CTA head and neck- normal          Visit Dx:     ICD-10-CM ICD-9-CM   1. Lymphedema of left upper extremity I89.0 457.1   2. Range of motion deficit M25.60 719.50   3. Pain in left arm M79.602 729.5       Patient History     Row Name 02/13/20 1400             History    Chief Complaint  Swelling;Pain;Tightness;Numbness;Muscle weakness;Fatigue/poor endurance;Difficulty with daily activities  -SG      Type of Pain  Upper Extremity / Arm;Shoulder pain;Chest pain  -SG      Brief Description of Current Complaint  Pt is a 51 yo female who presents to OP OT today w/ significant pain and significant LUE lymphedema. She has hx of L BrCA w/ bilateral mastectomy (0/4 LN involvment) in 2011, then a reccurence in 2013 (3/8 LN involvement).  She now unfortunately has what appears to be a second to axillary lymph node recurrence in the left axilla.  She additionally had a left upper extremity DVT and has developed significant lymphedema which is very symptomatic.  She was treated at [place] for DVT, and now reports to OT w/ significant pain of LUE, which is impairing her ROM and strength, and now she is no longer able to complete her ADL/IADLs independently.  -SG      Patient/Caregiver Goals  Relieve pain;Return to prior level of function;Improve mobility;Improve strength;Know what to do to help the symptoms;Decrease swelling  -SG      Current Tobacco Use  none  -SG      Smoking Status  none  -SG      Hand Dominance  right-handed  -SG      Occupation/sports/leisure activities  Pt has been on short term disability from work since December 2019  -SG         Pain     Pain Location  Arm;Chest;Hand;Shoulder  -SG      Pain at Present  10  -SG      Pain at Best  10  -SG      Pain at Worst  10  -SG      Pain Frequency  Constant/continuous  -SG      Pain Description  Tingling;Tightness;Tender;Shooting;Sharp;Numbness;Heaviness;Discomfort;Burning  -SG      Is your sleep disturbed?  Yes  -SG       Difficulties with ADL's?  Yes  -SG      Difficulties with recreational activities?  Yes  -SG         Daily Activities    Primary Language  English  -SG      Are you able to read  Yes  -SG      Are you able to write  Yes  -SG      How does patient learn best?  Listening;Reading;Demonstration;Pictures/Video  -SG      Teaching needs identified  Home Exercise Program;Management of Condition  -SG      Patient is concerned about/has problems with  Difficulty with self care (i.e. bathing, dressing, toileting:;Flexibility;Performing home management (household chores, shopping, care of dependents);Performing job responsibilities/community activities (work, school,;Reaching over head;Repetitive movements of the hand, arm, shoulder  -SG      Does patient have problems with the following?  Depression;Anxiety;Panic Attack  -SG      Pt Participated in POC and Goals  Yes  -SG         Safety    Are you being hurt, hit, or frightened by anyone at home or in your life?  No  -SG      Are you being neglected by a caregiver  No  -SG        User Key  (r) = Recorded By, (t) = Taken By, (c) = Cosigned By    Initials Name Provider Type    Chyna Alvarado OTR/L Occupational Therapist              02/13/20 1400   Subjective Pain   Able to rate subjective pain? yes   Pre-Treatment Pain Level 10   Post-Treatment Pain Level 10   Subjective Pain Comment Pt reports that she is in a constant state of 10/10 pain   Lymphedema Assessment   Lymphedema Classification LUE:;secondary;stage 2 (Spontaneously Irreversible)   Posture/Observations   Alignment Options Rounded shoulders;Scapular elevation   Rounded Shoulders Left:;Moderate   Scapular Elevation Left:;Severe   Posture/Observations Comments D/t pt's pain, she is guarding shoulder area and arm, causing significant scapular elevation, bringing shoulder up and neck down to meet to compensate for pain, which is only creating extra tension in shoulders/neck/back and more pain   General ROM   LT Upper  Ext Lt Shoulder ABduction;Lt Shoulder Flexion;Lt Shoulder External Rotation;Lt Shoulder Internal Rotation   GENERAL ROM COMMENTS Rt UE WFL grossly   Left Upper Ext   Lt Shoulder Abduction AROM 70   Lt Shoulder Flexion AROM 60   Lt Shoulder External Rotation AROM Unable to perform   Lt Shoulder Internal Rotation AROM Unable to perform   MMT (Manual Muscle Testing)   General MMT Comments Rt UE WFL grosly; LUE 2+/5   Lymphedema Edema Assessment   Ptting Edema Category By severity   Pitting Edema Moderate   Edema Assessment Comment Edema present from fingers to axilla   Skin Changes/Observations   Location/Assessment Upper Extremity;Upper Quadrant   Upper Extremity Conditions left:;intact;clean;dry   Upper Extremity Color/Pigment left:;normal   Upper Quadrant Conditions left:;intact;clean;dry   Upper Quadrant Color/Pigment left:;normal   Skin Observations Comment Lt chest and implant area w/ tight soft tissue restrictions   Lymphedema Sensation   Lymphedema Sensation Reports LUE:;numbness;tingling   Lymphedema Sensation Comments Pt reports that the underneath side of her arm is numb all the time, as well as her pinky and ring fingers. Pt experiences tingling constantly, and is especially hypersensitve to touch all throughout arm, especially hand   Lymphedema Measurements   Measurement Type(s) Quick Girth   Quick Girth Areas Upper extremities   LUE Quick Girth (cm)   Axilla 42 cm   Mid upper arm 40.5 cm   Elbow 36.5 cm   Mid forearm 32.5 cm   Wrist crease 20 cm   Web space 22 cm   Met-heads 21 cm   LUE Quick Girth Total 214.5   RUE Quick Girth (cm)   Axilla 28 cm   Mid upper arm 25 cm   Elbow 24 cm   Mid forearm 22.5 cm   Wrist crease 14 cm   Web space 18 cm   Met-heads 17.5 cm   RUE Quick Girth Total 149   Manual Lymphatic Drainage   Manual Lymphatic Drainage initial sequence;opened regional lymph nodes;extremity treatment   Initial Sequence short neck;cervical;supraclavicular;shoulder collectors;abdomen;diaphragmatic  breathing   Cervical right;left   Supraclavicular right;left   Shoulder Collectors right;left   Abdomen superficial   Diaphragmatic Breathing Yes   Opened Regional Lymph Nodes axillary   Axillary left   Extremity Treatment simple/brief MLD   Simple/Brief MLD More desensitization d/t pt's extreme hypersensitivity to touch/MLD   Compression/Skin Care   Compression/Skin Care skin care;compression garment   Skin Care moisturizing lotion applied   Compression Garment Comments Compressogrip size 3.5, 4, and 5 from hand to axilla             02/13/20 1400   OT Assessment   Functional Limitations Performance in self-care ADL;Performance in leisure activities;Limitations in functional capacity and performance;Limitations in community activities;Limitation in home management   Impairments Edema;Impaired flexibility;Impaired lymphatic circulation;Sensation;Range of motion;Posture;Pain;Muscle strength;Joint mobility   Assessment Comments Pt presents with significant ROM deficits, significant pain, and significant LUE lymphedema. Pt also presents w/ extreme hypersensivity to touch throughout arm, especially hand. She has other sensation issues including numbness, tingling, and burning. She will benefit from lymphedema tx to reduce edema, and with that associated pain and other deficits. Pt will benefit from OP OT to address lymphedema, ROM, strength, sensation, and promote a return to PLOF and independence w/ ADL/IADLs (as she is not independent with ADLs at this time, she requires assist from her family).   Please refer to paper survey for additional self-reported information Yes   Patient/caregiver participated in establishment of treatment plan and goals Yes   Patient would benefit from skilled therapy intervention Yes   OT Plan   OT Frequency 2x/week   Predicted Duration of Therapy Intervention (Therapy Eval) 20 visits   Planned CPT's? OT EVAL MOD COMPLEXITY: 95691;OT THER ACT EA 15 MIN: 54455PA;OT THER PROC EA 15 MIN:  35058GM;OT SELF CARE/MGMT/TRAIN 15 MIN: 42733;OT MANUAL THERAPY EA 15 MIN: 85951   Planned Therapy Interventions (Optional Details) home exercise program;manual therapy techniques;patient/family education;postural re-education;ROM (Range of Motion);strengthening;stretching   OT Plan Comments Continue with CDT including MLD, compression therapy, skin care, and exercise. Consider ASTYM/STM for tight soft tissue restrictions. Pursue appropriate compression garments and compression pump if appropriate         02/13/20 1400   OT Short Term Goals   STG Date to Achieve 03/15/20   STG 1 Pt and caregivers will understand lymphedema precautions to decrease the risk of infection and exacerbation of the lymphedema.   STG 2 Pt will develop a tolerance for wearing the compression between sessions to faciliate limb decongestion.   STG 3 Pt will perform HEP w/ minimal asisstance (including a self-MLD protocol) to improve lymphatic flow and venous return.   STG 4 Pt will demo improved ROM of LUE to at least 100 degrees for shoulder flexion and abduction to promote greater independence w/ ADL/IADLs.   Long Term Goals   LTG 1 Pt will be measured and fitted for and obtain appropriate compression garments and be independent w/ donning/doffing which will enable regular daily garment wear.   LTG 2 Pt will experience increased ROM and mobility in LUE to 120 degrees shoulder flexion and abduction to be able to actively participate in ADLs.   LTG 3 Treatment will achieve maximum edema and/or lymphedema reduction to enable functional improvements such as dressing, bathing, cooking/cleaning independently, and a return to PLOF   LTG 4 Pt and/or caregivers will be independent with HEP for lymphedema management to help prevent edema relapse and reduce the risk of infection.   Time Calculation   OT Goal Re-Cert Due Date 05/14/20       Therapy Education  Education Details: Pt was edu about lymphedema and tx for lymphedema. Pt was edu on CDT  including MLD, compression therapy, skin care, and exercise. Pt was edu on basic desensitization techniques to address her hypersensitivity and prepare her for MLD. Pt was also given simple HEP to start with that included PROM using her RUE as assist to keep her arm moving as she has significant deficits in ROM. Pt was edu on the importance of upright posture, and including rows and postural alignment in HEP  Given: HEP, Symptoms/condition management, Posture/body mechanics, Edema management  Program: New  How Provided: Verbal, Demonstration, Written  Provided to: Patient, Caregiver  Level of Understanding: Verbalized, Demonstrated  70036 - OT Self Care/Mgmt Minutes: 20         Manual Rx (last 36 hours)      Manual Treatments     Row Name 02/13/20 1400             Total Minutes    74800 - OT Manual Therapy Minutes  15  -SG        User Key  (r) = Recorded By, (t) = Taken By, (c) = Cosigned By    Initials Name Provider Type    Chyna Alvarado OTR/L Occupational Therapist                Outcome Measure Options: Disabilities of the Arm, Shoulder, and Hand (DASH)  DASH  Open a tight or new jar.: Unable  Write: Mild Difficulty  Turn a key: Mild Difficulty  Prepare a meal: Unable  Push open a heavy door: Unable  Place an object on a shelf above your head: Unable  Do heavy household chores (e.g., wash walls, wash floors): Unable  Garden or do yard work: Unable  Make a bed: Unable  Carry a shopping bag or briefcase: Unable  Carry a heavy object (over 10 lbs): Unable  Change a lightbulb overhead: Unable  Wash or blow dry your hair: Unable  Wash your back: Unable  Put on a pullover sweater: Severe Difficulty  Use a knife to cut food: Unable  Recreational activities in which require little effort (e.g., cardplaying, knitting, etc.): Unable  Recreational activities in which you take some force or impact through your arm, should or hand (e.g. golf, hammering, tennis, etc.): Unable  Recreational Activities in which you move your  arm freely (e.g., frisbee, badminton, etc.): Unable  Manage transportation needs (getting from one place to another): Unable  Sexual Activities: No Difficulty  During the past week, to what extent has your arm, shoulder, or hand problem interfered with your normal social activites with family, friends, neighbors or groups?: Extremely  During the past week, were you limited in your work or other regular daily activities as a result of your arm, shoulder or hand problem?: Unable  Arm, Shoulder, or hand pain: Extreme  Arm, shoulder or hand pain when you performed any specific activity: Extreme  Tingling (pins and needles) in your arm, shoulder, or hand: Extreme  Weakness in your arm, shoulder or hand: Extreme  Stiffness in your arm, shoulder or hand: Extreme  During the past week, how much difficulty have you had sleeping because of the pain in your arm, shoulder or hand?: So much Difficulty that I can't sleep  I feel less capable, less confident or less useful because of my arm, shoulder or hand problem: Strongly Agree  DASH Sum : 139  Number of Questions Answered: 30  DASH Score: 90.83         Time Calculation:   OT Start Time: 1400     Therapy Charges for Today     Code Description Service Date Service Provider Modifiers Qty    50493201335  OT THER PROC EA 15 MIN 2/13/2020 Chyna Ramirez OTR/L GO 1    [id number]  OT MANUAL THERAPY EA 15 MIN 2/13/2020 Chyna Ramirez OTR/L GO 1    [id number]  OT SELF CARE/MGMT/TRAIN EA 15 MIN 2/13/2020 Chyna Ramirez OTR/L GO 1    [id number]  OT EVAL MOD COMPLEXITY 3 2/13/2020 Chyna Ramirez OTR/L GO 1                    DELGADO Carney/KELSIE  2/14/2020

## 2020-02-18 ENCOUNTER — HOSPITAL ENCOUNTER (OUTPATIENT)
Dept: OCCUPATIONAL THERAPY | Facility: HOSPITAL | Age: 50
Setting detail: THERAPIES SERIES
Discharge: HOME | End: 2020-02-18

## 2020-02-18 DIAGNOSIS — I89.0 LYMPHEDEMA OF LEFT UPPER EXTREMITY: Primary | ICD-10-CM

## 2020-02-18 DIAGNOSIS — M25.60 RANGE OF MOTION DEFICIT: ICD-10-CM

## 2020-02-18 DIAGNOSIS — M79.602 PAIN IN LEFT ARM: ICD-10-CM

## 2020-02-18 PROCEDURE — 97535 SELF CARE MNGMENT TRAINING: CPT

## 2020-02-18 PROCEDURE — 97140 MANUAL THERAPY 1/> REGIONS: CPT

## 2020-02-19 NOTE — THERAPY TREATMENT NOTE
Outpatient Occupational Therapy Lymphedema Treatment Note  Psychiatric     Patient Name: Asuncion Flynn  : 1970  MRN: [id number]  Today's Date: 2020      Visit Date: 2020    Patient Active Problem List   Diagnosis   • Recurrent cancer of left breast (CMS/HCC)   • Chronic anxiety   • Lymphedema of left upper extremity   • Memory loss   • Periodic headache syndrome, not intractable   • Palpitations   • Chest pain, atypical   • TIA (transient ischemic attack)   • Dyspnea on exertion   • Hyperlipidemia LDL goal <100        Past Medical History:   Diagnosis Date   • Anxiety associated with depression    • Breast cancer (CMS/HCC)    • Cancer (CMS/HCC)     hx of breast cancer x2   • Depression    • Drug therapy    • H/O mastectomy    • Hearing difficulty     Bilaterally. Recurrent ear infections as well as allergies and fluid in her ears.Not progressive.    • Hyperlipidemia    • Hypothyroidism    • Migraines 2019    complex   • Multiple allergies    • Panic disorder    • Twitching     2016: Little bit of twitching in the left upper extremity that began about 3 months or so ago, again it is not progressive in the slightest but it is noticeable.Left upper extremity movement disorder, non-progressive, could be related to anxiety.    • Vitamin D deficiency         Past Surgical History:   Procedure Laterality Date   • AUGMENTATION MAMMAPLASTY      reconstruction   • BREAST BIOPSY     • CARDIOVASCULAR STRESS TEST  2017    [facility]- 6 min 31 sec, 93% THR, 162/70, negative for ischemia   • ECHO - CONVERTED  2013    @[facility]. . EF 65%.   • ECHO - CONVERTED  2017    EF 60%, normal diastolic function, mild MR   • ECHO - CONVERTED  2019    ([facility]) Echo with bubble- EF 60%, no shunt, RVSP 15 mmHg    • MASTECTOMY Bilateral 2011   • OTHER SURGICAL HISTORY  2019    MRI brain- no infarct, sinusitis   • OTHER SURGICAL HISTORY  2019    CT head- no infarct   • OTHER  SURGICAL HISTORY  03/23/2019    CTA head and neck- normal          Visit Dx:      ICD-10-CM ICD-9-CM   1. Lymphedema of left upper extremity I89.0 457.1   2. Range of motion deficit M25.60 719.50   3. Pain in left arm M79.602 729.5       Lymphedema     Row Name 02/18/20 1000             Subjective Pain    Able to rate subjective pain?  yes  -SG      Pre-Treatment Pain Level  10  -SG      Post-Treatment Pain Level  8  -SG      Subjective Pain Comment  Pt states that she is always in a significant amount of pain  -SG         Subjective Comments    Subjective Comments  Pt reports that she was not able to tolerate the compressogrip very well. She reports that it was tighter than what she is used to wearing and had to take it off after about a day. However, pt does report that she has been compliant with HEP including desensitization techniques and ROM exercises.  -SG         Lymphedema Assessment    Lymphedema Classification  LUE:;secondary;stage 2 (Spontaneously Irreversible)  -SG         Posture/Observations    Alignment Options  Rounded shoulders;Scapular elevation  -SG      Rounded Shoulders  Left:;Moderate  -SG      Scapular Elevation  Left:;Severe  -SG      Posture/Observations Comments  D/t pt's pain, she is guarding shoulder area and arm, causing significant scapular elevation, bringing shoulder up and neck down to meet to compensate for pain, which is only creating extra tension in shoulders/neck/back and more pain  -SG         Lymphedema Edema Assessment    Ptting Edema Category  By severity  -SG      Pitting Edema  Moderate  -SG      Edema Assessment Comment  Edema present from fingers to axilla  -SG         Skin Changes/Observations    Location/Assessment  Upper Extremity;Upper Quadrant  -SG      Upper Extremity Conditions  left:;intact;clean;dry  -SG      Upper Extremity Color/Pigment  left:;normal  -SG      Upper Quadrant Conditions  left:;intact;clean;dry  -SG      Upper Quadrant Color/Pigment   left:;normal  -SG      Skin Observations Comment  Lt chest and implant area w/ tight soft tissue restrictions  -SG         Lymphedema Sensation    Lymphedema Sensation Reports  LUE:;numbness;tingling  -SG         Lymphedema Measurements    Measurement Type(s)  Quick Girth  -SG      Quick Girth Areas  Upper extremities  -SG         Manual Lymphatic Drainage    Manual Lymphatic Drainage  initial sequence;opened regional lymph nodes;extremity treatment  -SG      Initial Sequence  short neck;cervical;supraclavicular;shoulder collectors;abdomen;diaphragmatic breathing  -SG      Cervical  right;left  -SG      Supraclavicular  right;left  -SG      Shoulder Collectors  right;left  -SG      Abdomen  superficial  -SG      Opened Regional Lymph Nodes  axillary  -SG      Axillary  left  -SG      Extremity Treatment  simple/brief MLD  -SG      Simple/Brief MLD  More desensitization d/t pt's extreme hypersensitivity to touch/MLD  -SG      Manual Lymphatic Drainage Comments  Pt started out in supine w/ head of the table up as far as it could go, however, was yelling out in pain, and therefore we had to take time to find a comfortable position, calm down, and take deep breaths. Pt eventually found a comfortable position sitting on the edge of the table with shoulders rounded.  -SG         Compression/Skin Care    Compression/Skin Care  skin care;wrapping location  -SG      Skin Care  moisturizing lotion applied  -SG      Wrapping Location  upper extremity  -SG      Wrapping Location UE  left:;fingers to axilla  -SG      Wrapping Comments  Layer of compressogrip as base layer, size 5 and 8. Comprilan short stretch bandages size 6,8,10 over that, in addition to finger wraps.  -SG        User Key  (r) = Recorded By, (t) = Taken By, (c) = Cosigned By    Initials Name Provider Type    Chyna Alvarado OTR/L Occupational Therapist                  OT Assessment/Plan     Row Name 02/18/20 1000          OT Assessment    Functional  Limitations  Performance in self-care ADL;Performance in leisure activities;Limitations in functional capacity and performance;Limitations in community activities;Limitation in home management  -SG     Impairments  Edema;Impaired flexibility;Impaired lymphatic circulation;Sensation;Range of motion;Posture;Pain;Muscle strength;Joint mobility  -SG     Assessment Comments  Pt continues to present in a state of extreme constant pain. She carries a pillow underneath her arm at all times for comfort and support. She continues to demo significant sensitivity in LUE, and therefore at this time, we continue to work on desensitization techniques before we will be able to get to MLD. This session, pt was wrapped in multilayer short stretch compression bandages. Pt does report compliance with HEP, and does demo improved mobility, as she is able to complete more AROM w/ LUE.  -SG     Patient/caregiver participated in establishment of treatment plan and goals  Yes  -SG     Patient would benefit from skilled therapy intervention  Yes  -SG        OT Plan    OT Frequency  2x/week  -SG     Planned CPT's?  OT EVAL MOD COMPLEXITY: 94733;OT THER ACT EA 15 MIN: [id number];OT THER PROC EA 15 MIN: [id number];OT SELF CARE/MGMT/TRAIN 15 MIN: 72966;OT MANUAL THERAPY EA 15 MIN: 50835  -SG     Planned Therapy Interventions (Optional Details)  home exercise program;manual therapy techniques;patient/family education;postural re-education;ROM (Range of Motion);strengthening;stretching  -SG     OT Plan Comments  Continue with CDT including MLD, compression therapy, skin care, and exercise. Consider ASTYM/STM for tight soft tissue restrictions. Pursue appropriate compression garments and compression pump if appropriate  -SG       User Key  (r) = Recorded By, (t) = Taken By, (c) = Cosigned By    Initials Name Provider Type    Chyna Alvarado OTR/L Occupational Therapist                 Manual Rx (last 36 hours)      Manual Treatments     Row Name  02/18/20 1000             Total Minutes    10051 - OT Manual Therapy Minutes  45  -SG        User Key  (r) = Recorded By, (t) = Taken By, (c) = Cosigned By    Initials Name Provider Type    Chyna Alvarado OTR/L Occupational Therapist            Therapy Education  Education Details: Pt was edu to continue with HEP as directed. Pt and mother were edu on short stretch multi layer compression wrapping and demo'ed with detailed explaination of how to andrew/manage. OT answered all questions, and encouraged pt to call if she was experiencing any adverse effects. Pt was edu to take off if she became symptomatic.  Given: HEP, Symptoms/condition management, Posture/body mechanics, Edema management  Program: New  How Provided: Verbal, Demonstration, Written  Provided to: Patient, Caregiver  Level of Understanding: Verbalized, Demonstrated  32618 - OT Self Care/Mgmt Minutes: 15                Time Calculation:   OT Start Time: 1000  OT Stop Time: 1100  OT Time Calculation (min): 60 min     Therapy Charges for Today     Code Description Service Date Service Provider Modifiers Qty    18760678800 HC OT MANUAL THERAPY EA 15 MIN 2/18/2020 Chyna Ramirez OTR/L GO 3    60483038681 HC OT SELF CARE/MGMT/TRAIN EA 15 MIN 2/18/2020 Chyna Ramirez OTR/L GO 1                      DELGADO Carney/KELSIE  2/19/2020

## 2020-02-20 ENCOUNTER — OFFICE VISIT (OUTPATIENT)
Dept: ONCOLOGY | Facility: CLINIC | Age: 50
End: 2020-02-20

## 2020-02-20 ENCOUNTER — HOSPITAL ENCOUNTER (OUTPATIENT)
Dept: OCCUPATIONAL THERAPY | Facility: HOSPITAL | Age: 50
Setting detail: THERAPIES SERIES
Discharge: HOME | End: 2020-02-20

## 2020-02-20 VITALS
DIASTOLIC BLOOD PRESSURE: 78 MMHG | OXYGEN SATURATION: 99 % | TEMPERATURE: 98.3 F | HEIGHT: 63 IN | WEIGHT: 170 LBS | RESPIRATION RATE: 20 BRPM | HEART RATE: 105 BPM | SYSTOLIC BLOOD PRESSURE: 120 MMHG | BODY MASS INDEX: 30.12 KG/M2

## 2020-02-20 DIAGNOSIS — M79.602 PAIN IN LEFT ARM: ICD-10-CM

## 2020-02-20 DIAGNOSIS — M25.60 RANGE OF MOTION DEFICIT: ICD-10-CM

## 2020-02-20 DIAGNOSIS — C50.912 RECURRENT CANCER OF LEFT BREAST (HCC): Primary | ICD-10-CM

## 2020-02-20 DIAGNOSIS — I89.0 LYMPHEDEMA OF LEFT UPPER EXTREMITY: Primary | ICD-10-CM

## 2020-02-20 PROCEDURE — 97140 MANUAL THERAPY 1/> REGIONS: CPT

## 2020-02-20 PROCEDURE — 97535 SELF CARE MNGMENT TRAINING: CPT

## 2020-02-20 PROCEDURE — 99214 OFFICE O/P EST MOD 30 MIN: CPT | Performed by: INTERNAL MEDICINE

## 2020-02-20 RX ORDER — ANASTROZOLE 1 MG/1
1 TABLET ORAL DAILY
Qty: 30 TABLET | Refills: 11 | Status: SHIPPED | OUTPATIENT
Start: 2020-02-20

## 2020-02-20 NOTE — THERAPY TREATMENT NOTE
Outpatient Occupational Therapy Lymphedema Treatment Note  [facility]     Patient Name: Asuncion Flynn  : 1970  MRN: [id number]  Today's Date: 2020      Visit Date: 2020    Patient Active Problem List   Diagnosis   • Recurrent cancer of left breast (CMS/HCC)   • Chronic anxiety   • Lymphedema of left upper extremity   • Memory loss   • Periodic headache syndrome, not intractable   • Palpitations   • Chest pain, atypical   • TIA (transient ischemic attack)   • Dyspnea on exertion   • Hyperlipidemia LDL goal <100        Past Medical History:   Diagnosis Date   • Anxiety associated with depression    • Breast cancer (CMS/HCC)    • Cancer (CMS/HCC)     hx of breast cancer x2   • Depression    • Drug therapy    • H/O mastectomy    • Hearing difficulty     Bilaterally. Recurrent ear infections as well as allergies and fluid in her ears.Not progressive.    • Hyperlipidemia    • Hypothyroidism    • Migraines 2019    complex   • Multiple allergies    • Panic disorder    • Twitching     2016: Little bit of twitching in the left upper extremity that began about 3 months or so ago, again it is not progressive in the slightest but it is noticeable.Left upper extremity movement disorder, non-progressive, could be related to anxiety.    • Vitamin D deficiency         Past Surgical History:   Procedure Laterality Date   • AUGMENTATION MAMMAPLASTY      reconstruction   • BREAST BIOPSY     • CARDIOVASCULAR STRESS TEST  2017    [facility]- 6 min 31 sec, 93% THR, 162/70, negative for ischemia   • ECHO - CONVERTED  2013    @[facility]. . EF 65%.   • ECHO - CONVERTED  2017    EF 60%, normal diastolic function, mild MR   • ECHO - CONVERTED  2019    ([facility]) Echo with bubble- EF 60%, no shunt, RVSP 15 mmHg    • MASTECTOMY Bilateral 2011   • OTHER SURGICAL HISTORY  2019    MRI brain- no infarct, sinusitis   • OTHER SURGICAL HISTORY  2019    CT head- no infarct   • OTHER  SURGICAL HISTORY  03/23/2019    CTA head and neck- normal          Visit Dx:      ICD-10-CM ICD-9-CM   1. Lymphedema of left upper extremity I89.0 457.1   2. Range of motion deficit M25.60 719.50   3. Pain in left arm M79.602 729.5       Lymphedema     Row Name 02/20/20 1000             Subjective Pain    Able to rate subjective pain?  yes  -SG      Pre-Treatment Pain Level  10  -SG      Post-Treatment Pain Level  5  -SG         Subjective Comments    Subjective Comments  Pt states that she had a bad day yesterday, could not get her pain under control.  However, by the end of session pt did start to notice more improvements and spirits were lifted.  -SG         Lymphedema Assessment    Lymphedema Classification  LUE:;secondary;stage 2 (Spontaneously Irreversible)  -SG         Posture/Observations    Alignment Options  Rounded shoulders;Scapular elevation  -SG      Rounded Shoulders  Left:;Moderate  -SG      Scapular Elevation  Left:;Severe  -SG      Posture/Observations Comments  D/t pt's pain, she is guarding shoulder area and arm, causing significant scapular elevation, bringing shoulder up and neck down to meet to compensate for pain, which is only creating extra tension in shoulders/neck/back and more pain  -SG         Lymphedema Edema Assessment    Ptting Edema Category  By severity  -SG      Pitting Edema  Moderate  -SG         Skin Changes/Observations    Location/Assessment  Upper Extremity;Upper Quadrant  -SG      Upper Extremity Conditions  left:;intact;clean;dry  -SG      Upper Extremity Color/Pigment  left:;normal  -SG      Upper Quadrant Conditions  left:;intact;clean;dry  -SG      Upper Quadrant Color/Pigment  left:;normal  -SG         Lymphedema Sensation    Lymphedema Sensation Reports  LUE:;numbness;tingling  -SG         Lymphedema Measurements    Measurement Type(s)  Quick Girth  -SG      Quick Girth Areas  Upper extremities  -SG         Manual Lymphatic Drainage    Manual Lymphatic Drainage   initial sequence;opened regional lymph nodes;extremity treatment  -SG      Initial Sequence  short neck;cervical;supraclavicular;shoulder collectors;abdomen;diaphragmatic breathing  -SG      Cervical  right;left  -SG      Supraclavicular  right;left  -SG      Shoulder Collectors  right;left  -SG      Abdomen  superficial  -SG      Opened Regional Lymph Nodes  axillary  -SG      Axillary  left  -SG      Extremity Treatment  simple/brief MLD  -SG      Simple/Brief MLD  More desensitization d/t pt's extreme hypersensitivity to touch/MLD  -SG      Manual Lymphatic Drainage Comments  Pt started out in supine w/ head of the table up as far as it could go, however, d/t pain, and eventually we found a comfortable position sitting on the edge of the table with shoulders rounded and pillows stacked under arm to elevate.  -SG         Compression/Skin Care    Compression/Skin Care  skin care;compression garment  -SG      Skin Care  moisturizing lotion applied  -SG      Wrapping Location  --  -SG      Wrapping Location UE  --  -SG      Compression Garment Comments  Compressogrip size 4 and 5 single layer  -SG        User Key  (r) = Recorded By, (t) = Taken By, (c) = Cosigned By    Initials Name Provider Type    SG Chyna Ramirez, OTR/L Occupational Therapist                  OT Assessment/Plan     Row Name 02/20/20 1000          OT Assessment    Functional Limitations  Performance in self-care ADL;Performance in leisure activities;Limitations in functional capacity and performance;Limitations in community activities;Limitation in home management  -SG     Impairments  Edema;Impaired flexibility;Impaired lymphatic circulation;Sensation;Range of motion;Posture;Pain;Muscle strength;Joint mobility  -SG     Assessment Comments  Pt continues to present in a state of extreme constant pain. She carries a pillow underneath her arm at all times for comfort and support. She continues to demo significant sensitivity in LUE, and therefore at  this time, we continue to work on desensitization techniques before we will be able to get to MLD. Pt does report compliance with HEP, and does demo improved mobility, as she is able to complete more AROM w/ LUE.  Pt did demo ability to perform greater elbow extension and shoulder abduction. On this date, we did not do another round of compression wrapping d/t pt's inability to tolerate at this time, continue to work with compressogrip and progress to bandaging.  -SG     Patient/caregiver participated in establishment of treatment plan and goals  Yes  -SG     Patient would benefit from skilled therapy intervention  Yes  -SG        OT Plan    OT Frequency  2x/week  -SG     Planned CPT's?  OT EVAL MOD COMPLEXITY: 35249;OT THER ACT EA 15 MIN: 14070XP;OT THER PROC EA 15 MIN: 39742ZP;OT SELF CARE/MGMT/TRAIN 15 MIN: 08980;OT MANUAL THERAPY EA 15 MIN: 99181  -SG     Planned Therapy Interventions (Optional Details)  home exercise program;manual therapy techniques;patient/family education;postural re-education;ROM (Range of Motion);strengthening;stretching  -SG     OT Plan Comments  Continue with CDT including MLD, compression therapy, skin care, and exercise. Consider ASTYM/STM for tight soft tissue restrictions. Pursue appropriate compression garments and compression pump if appropriate  -SG       User Key  (r) = Recorded By, (t) = Taken By, (c) = Cosigned By    Initials Name Provider Type    Chyna Alvarado OTR/L Occupational Therapist                 Manual Rx (last 36 hours)      Manual Treatments     Row Name 02/20/20 1000             Total Minutes    54614 - OT Manual Therapy Minutes  45  -SG        User Key  (r) = Recorded By, (t) = Taken By, (c) = Cosigned By    Initials Name Provider Type    Chyna Alvarado OTR/L Occupational Therapist            Therapy Education  Education Details: Progressed: table slides for shoulder/UE mobility  Given: HEP, Symptoms/condition management, Posture/body mechanics, Edema  management  Program: New, Progressed  How Provided: Verbal, Demonstration, Written  Provided to: Patient, Caregiver  Level of Understanding: Verbalized, Demonstrated  53558 - OT Self Care/Mgmt Minutes: 15                Time Calculation:   OT Start Time: 1000  OT Stop Time: 1100  OT Time Calculation (min): 60 min     Therapy Charges for Today     Code Description Service Date Service Provider Modifiers Qty    68806579066 HC OT MANUAL THERAPY EA 15 MIN 2/20/2020 Chyna Ramirez, OTR/L GO 3    66702436169 HC OT SELF CARE/MGMT/TRAIN EA 15 MIN 2/20/2020 Chyna Ramirez, OTR/L GO 1                      Chyna Ramierz OTR/L  2/20/2020

## 2020-02-20 NOTE — PROGRESS NOTES
PROBLEM LIST:  1. Stage II Left breast cancer, ER+ PA+ Her2 negative.  A) bilateral mastectomy 5/2011.  Pathology showed 8 negative lymph nodes.  Adjuvant chemotherapy with TC.  Intolerant of tamoxifen, femara, and aromasin.  B) recurrence in left axillary lymph nodes - 3 or 4 LN involved, surgically removed 11/2014.  Treated with FEC x 5 cycles.  C) presented with left arm swelling in December 2019.  She was diagnosed with left upper extremity DVT.  CT chest 12/12/2019 showed no evidence of pulmonary embolism, but shotty left axillary lymph nodes.  PET scan 1/27/2020 showed hypermetabolic activity in the left axillary lymph nodes with no other evidence of disease.  2. Anxiety  3. Cognitive impairment    Subjective      CC: breast cancer    HISTORY OF PRESENT ILLNESS:   Asuncion Flynn returns for follow-up.  She has been seeing occupational therapy and has also met with Monica Ivey.  She is very scared about starting the hormonal therapy given her prior experience with it.  She says her range of motion of the arm is better but the pain has not improved.    Past Medical History, Past Surgical History, Social History, Family History have been reviewed and are without significant changes except as mentioned.    Review of Systems   A comprehensive 14 point review of systems was performed and was negative except as mentioned.    Medications:  The current medication list was reviewed in the EMR    ALLERGIES:    Allergies   Allergen Reactions   • Keflex [Cephalexin] Unknown - High Severity     Caused lips to turn blue in color   • Morphine And Related Shortness Of Breath and Itching     Difficulty breathing   • Ciprofloxacin Itching   • Codeine Hallucinations   • Erythromycin Itching   • Penicillins Itching   • Percocet [Oxycodone-Acetaminophen] Nausea And Vomiting   • Sulfa Antibiotics Itching   • Fluticasone Anxiety and Tinnitus   • Protonix [Pantoprazole Sodium] Anxiety     nervous       Objective      /78   " Pulse 105   Temp 98.3 °F (36.8 °C) (Temporal)   Resp 20   Ht 160 cm (62.99\")   Wt 77.1 kg (170 lb)   SpO2 99%   BMI 30.12 kg/m²      Performance Status: 0    General: well appearing female in no acute distress  Neuro: alert and oriented  HEENT: sclera anicteric, oropharynx clear  Lymphatics: no cervical, supraclavicular, or axillary adenopathy  Cardiovascular: regular rate and rhythm, no murmurs  Chest: Status post bilateral mastectomy with implant reconstruction.  Dilated blood vessels on the left chest wall  Lungs: clear to auscultation bilaterally  Abdomen: soft, nontender, nondistended.  No palpable organomegaly  Extremeties: no lower extremity edema.  Significant swelling and tenderness of the left upper extremity from the hand up to the shoulder  Skin: no rashes, lesions, bruising, or petechiae  Psych: mood and affect appropriate          Assessment/Plan   Asuncion Flynn is a 50 y.o. year old female with a history of early stage ER positive HER-2 negative breast cancer who had a local recurrence now about 5 years ago.  She now unfortunately has what appears to be a second to axillary lymph node recurrence in the left axilla.      Recurrent breast cancer: We will plan to start anastrozole.  We reviewed the potential side effects of anastrozole including hot flashes, vaginal dryness, joint pain, decrease in bone density, and mood or sleep disturbance.  Once her lymphedema has improved I will plan to refer her to radiation oncology for discussion of radiation treatment.    Anxiety/panic disorder: follow up with Monica Ivey.  Continue Lexapro.    Lymphedema: Continue occupational therapy twice weekly.  I am encouraged that she has improvement in range of motion after only a week.    DVT left upper extremity: Continue Eliquis.  She will likely need to continue this long-term.    Follow-up in 2 weeks.        I spent 25 minutes with the patient. I spent > 50% percent of this time counseling and discussing " prognosis, diagnostic testing, evaluation, current status and management.      Acrelia Rubi MD  [facility]ology [facility]    2/20/2020          CC:

## 2020-02-25 ENCOUNTER — HOSPITAL ENCOUNTER (OUTPATIENT)
Dept: OCCUPATIONAL THERAPY | Facility: HOSPITAL | Age: 50
Setting detail: THERAPIES SERIES
Discharge: HOME | End: 2020-02-25

## 2020-02-25 DIAGNOSIS — M79.602 PAIN IN LEFT ARM: ICD-10-CM

## 2020-02-25 DIAGNOSIS — I89.0 LYMPHEDEMA OF LEFT UPPER EXTREMITY: Primary | ICD-10-CM

## 2020-02-25 DIAGNOSIS — M25.60 RANGE OF MOTION DEFICIT: ICD-10-CM

## 2020-02-25 PROCEDURE — 97110 THERAPEUTIC EXERCISES: CPT

## 2020-02-25 PROCEDURE — 97140 MANUAL THERAPY 1/> REGIONS: CPT

## 2020-02-25 NOTE — THERAPY TREATMENT NOTE
Outpatient Occupational Therapy Lymphedema Treatment Note  [facility]     Patient Name: Asuncion Flynn  : 1970  MRN: [id number]  Today's Date: 2020      Visit Date: 2020    Patient Active Problem List   Diagnosis   • Recurrent cancer of left breast (CMS/HCC)   • Chronic anxiety   • Lymphedema of left upper extremity   • Memory loss   • Periodic headache syndrome, not intractable   • Palpitations   • Chest pain, atypical   • TIA (transient ischemic attack)   • Dyspnea on exertion   • Hyperlipidemia LDL goal <100        Past Medical History:   Diagnosis Date   • Anxiety associated with depression    • Breast cancer (CMS/HCC)    • Cancer (CMS/HCC)     hx of breast cancer x2   • Depression    • Drug therapy    • H/O mastectomy    • Hearing difficulty     Bilaterally. Recurrent ear infections as well as allergies and fluid in her ears.Not progressive.    • Hyperlipidemia    • Hypothyroidism    • Migraines 2019    complex   • Multiple allergies    • Panic disorder    • Twitching     2016: Little bit of twitching in the left upper extremity that began about 3 months or so ago, again it is not progressive in the slightest but it is noticeable.Left upper extremity movement disorder, non-progressive, could be related to anxiety.    • Vitamin D deficiency         Past Surgical History:   Procedure Laterality Date   • AUGMENTATION MAMMAPLASTY      reconstruction   • BREAST BIOPSY     • CARDIOVASCULAR STRESS TEST  2017    [facility]- 6 min 31 sec, 93% THR, 162/70, negative for ischemia   • ECHO - CONVERTED  2013    @[facility]. . EF 65%.   • ECHO - CONVERTED  2017    EF 60%, normal diastolic function, mild MR   • ECHO - CONVERTED  2019    ([facility]) Echo with bubble- EF 60%, no shunt, RVSP 15 mmHg    • MASTECTOMY Bilateral 2011   • OTHER SURGICAL HISTORY  2019    MRI brain- no infarct, sinusitis   • OTHER SURGICAL HISTORY  2019    CT head- no infarct   • OTHER  SURGICAL HISTORY  03/23/2019    CTA head and neck- normal          Visit Dx:      ICD-10-CM ICD-9-CM   1. Lymphedema of left upper extremity I89.0 457.1   2. Range of motion deficit M25.60 719.50   3. Pain in left arm M79.602 729.5       Lymphedema     Row Name 02/25/20 1000             Subjective Pain    Able to rate subjective pain?  yes  -SG      Pre-Treatment Pain Level  10  -SG      Post-Treatment Pain Level  5  -SG         Lymphedema Assessment    Lymphedema Classification  LUE:;secondary;stage 2 (Spontaneously Irreversible)  -SG         Posture/Observations    Alignment Options  Rounded shoulders;Scapular elevation  -      Rounded Shoulders  Left:;Moderate  -SG      Scapular Elevation  Left:;Severe  -SG      Posture/Observations Comments  D/t pt's pain, she is guarding shoulder area and arm, causing significant scapular elevation, bringing shoulder up and neck down to meet to compensate for pain, which is only creating extra tension in shoulders/neck/back and more pain  -SG         Lymphedema Edema Assessment    Ptting Edema Category  By severity  -      Pitting Edema  Moderate  -SG         Skin Changes/Observations    Location/Assessment  Upper Extremity;Upper Quadrant  -SG      Upper Extremity Conditions  left:;intact;clean;dry  -SG      Upper Extremity Color/Pigment  left:;normal  -SG      Upper Quadrant Conditions  left:;intact;clean;dry  -SG      Upper Quadrant Color/Pigment  left:;normal  -SG         Lymphedema Sensation    Lymphedema Sensation Reports  LUE:;numbness;tingling  -         Lymphedema Measurements    Measurement Type(s)  Quick Girth  -SG      Quick Girth Areas  Upper extremities  -SG         Manual Lymphatic Drainage    Manual Lymphatic Drainage  initial sequence;opened regional lymph nodes;extremity treatment  -SG      Initial Sequence  short neck;cervical;supraclavicular;shoulder collectors;abdomen;diaphragmatic breathing  -SG      Cervical  right;left  -SG      Supraclavicular   right;left  -SG      Shoulder Collectors  right;left  -SG      Abdomen  superficial  -SG      Opened Regional Lymph Nodes  axillary  -SG      Axillary  left  -SG      Extremity Treatment  simple/brief MLD  -SG      Manual Lymphatic Drainage Comments  Pt started out in supine w/ head of the table up as far as it could go, however, d/t pain, and eventually we found a comfortable position sitting on the edge of the table with shoulders rounded and pillows stacked under arm to elevate.  -SG         Compression/Skin Care    Compression/Skin Care  skin care;compression garment  -SG      Skin Care  moisturizing lotion applied  -SG      Compression Garment Comments  Compressogrip size 4 and 5 single layer  -SG        User Key  (r) = Recorded By, (t) = Taken By, (c) = Cosigned By    Initials Name Provider Type    Chyna Alvarado OTR/L Occupational Therapist                  OT Assessment/Plan     Row Name 02/25/20 1000          OT Assessment    Functional Limitations  Performance in self-care ADL;Performance in leisure activities;Limitations in functional capacity and performance;Limitations in community activities;Limitation in home management  -SG     Impairments  Edema;Impaired flexibility;Impaired lymphatic circulation;Sensation;Range of motion;Posture;Pain;Muscle strength;Joint mobility  -SG     Assessment Comments  Pt continues to present in a state of significant constant pain. She carries a pillow underneath her arm at all times for comfort and support. Pt does demo improved tolerance w/ sensitivity, and does not demo adverse reactions to touch any longer. Pt does report compliance with HEP, and does demo improved mobility, as she is able to complete more AROM w/ LUE.  Pt did demo ability to perform greater elbow extension and shoulder abduction.  Pt was provided with self-MLD and mobility HEP, as well as a shoulder pulley to promote ROM in LUE. On this date, we did not do another round of compression wrapping d/t  pt's inability to tolerate at this time, continue to work with compressogrip and progress to bandaging.  -SG     Patient/caregiver participated in establishment of treatment plan and goals  Yes  -SG     Patient would benefit from skilled therapy intervention  Yes  -SG        OT Plan    OT Frequency  2x/week  -SG     Planned CPT's?  OT EVAL MOD COMPLEXITY: 86599;OT THER ACT EA 15 MIN: 92629XY;OT THER PROC EA 15 MIN: 60997LD;OT SELF CARE/MGMT/TRAIN 15 MIN: 23213;OT MANUAL THERAPY EA 15 MIN: 66782  -SG     Planned Therapy Interventions (Optional Details)  home exercise program;manual therapy techniques;patient/family education;postural re-education;ROM (Range of Motion);strengthening;stretching  -SG     OT Plan Comments  Continue with CDT including MLD, compression therapy, skin care, and exercise. Consider ASTYM/STM for tight soft tissue restrictions. Pursue appropriate compression garments and compression pump if appropriate  -SG       User Key  (r) = Recorded By, (t) = Taken By, (c) = Cosigned By    Initials Name Provider Type    Chyna Alvarado OTR/L Occupational Therapist            OT Exercises     Row Name 02/25/20 1000             Exercise 1    Exercise Name 1  LUE PROM (shoulder flexion, elbow flexion/extension, wrist flexion/extension, pronation/supination  -SG      Cueing 1  Demo;Verbal;Tactile  -SG      Sets 1  1  -SG      Reps 1  10  -SG         Exercise 2    Exercise Name 2  LUE AAROM  -SG      Cueing 2  Demo;Tactile;Verbal  -SG      Equipment 2  Pulley  -SG      Sets 2  1  -SG      Reps 2  10  -SG        User Key  (r) = Recorded By, (t) = Taken By, (c) = Cosigned By    Initials Name Provider Type    Chyna Alvaardo OTR/L Occupational Therapist           Manual Rx (last 36 hours)      Manual Treatments     Row Name 02/25/20 1000             Total Minutes    82560 - OT Manual Therapy Minutes  30  -SG        User Key  (r) = Recorded By, (t) = Taken By, (c) = Cosigned By    Initials Name Provider Type     SG Chyna Ramirez OTR/L Occupational Therapist            Therapy Education  Education Details: Progressed HEP to include: self-MLD combined w/ mobility/ exercises (provided w/ handout of instructions, and demo'd w/ her step by step instructions); also provided with shoulder pulley to promote greater ROM and mobility in LUE  Given: HEP, Symptoms/condition management, Posture/body mechanics, Edema management  Program: New, Progressed  How Provided: Verbal, Demonstration, Written  Provided to: Patient, Caregiver  Level of Understanding: Verbalized, Demonstrated                Time Calculation:   OT Start Time: 1000  OT Stop Time: 1100  OT Time Calculation (min): 60 min     Therapy Charges for Today     Code Description Service Date Service Provider Modifiers Qty    82985617973  OT THER PROC EA 15 MIN 2/25/2020 Chyna Ramirez OTR/L GO 2    [id number]  OT MANUAL THERAPY EA 15 MIN 2/25/2020 Chyna Ramirez OTR/L GO 2                      DELGADO Carney/KELSIE  2/25/2020

## 2020-02-27 ENCOUNTER — APPOINTMENT (OUTPATIENT)
Dept: OCCUPATIONAL THERAPY | Facility: HOSPITAL | Age: 50
End: 2020-02-27

## 2020-02-27 ENCOUNTER — TELEPHONE (OUTPATIENT)
Dept: ONCOLOGY | Facility: CLINIC | Age: 50
End: 2020-02-27

## 2020-02-27 NOTE — TELEPHONE ENCOUNTER
I talked with Dr Rubi and she said it was fine for patient to have numbing med and fillings done.  I called Raissa at Dr Villarreal to let her know.

## 2020-02-27 NOTE — TELEPHONE ENCOUNTER
Raissa with Dr. Villarreal's office called patient is having 2 fillings done and would have to be numb for this. Is this okay to do this with her being on the blood thinners. Her appointment is Monday. Please call.

## 2020-03-02 DIAGNOSIS — Z51.81 THERAPEUTIC DRUG MONITORING: Primary | ICD-10-CM

## 2020-03-03 ENCOUNTER — HOSPITAL ENCOUNTER (OUTPATIENT)
Dept: OCCUPATIONAL THERAPY | Facility: HOSPITAL | Age: 50
Setting detail: THERAPIES SERIES
Discharge: HOME | End: 2020-03-03

## 2020-03-03 ENCOUNTER — LAB (OUTPATIENT)
Dept: LAB | Facility: HOSPITAL | Age: 50
End: 2020-03-03

## 2020-03-03 ENCOUNTER — OFFICE VISIT (OUTPATIENT)
Dept: PALLIATIVE CARE | Facility: CLINIC | Age: 50
End: 2020-03-03

## 2020-03-03 VITALS
HEART RATE: 104 BPM | SYSTOLIC BLOOD PRESSURE: 118 MMHG | BODY MASS INDEX: 30.25 KG/M2 | WEIGHT: 170.7 LBS | OXYGEN SATURATION: 96 % | DIASTOLIC BLOOD PRESSURE: 78 MMHG

## 2020-03-03 DIAGNOSIS — I89.0 LYMPHEDEMA OF LEFT UPPER EXTREMITY: Primary | ICD-10-CM

## 2020-03-03 DIAGNOSIS — Z51.81 THERAPEUTIC DRUG MONITORING: ICD-10-CM

## 2020-03-03 DIAGNOSIS — G90.512 COMPLEX REGIONAL PAIN SYNDROME TYPE 1 AFFECTING LEFT UPPER ARM: Primary | ICD-10-CM

## 2020-03-03 DIAGNOSIS — M79.602 PAIN IN LEFT ARM: ICD-10-CM

## 2020-03-03 DIAGNOSIS — M25.60 RANGE OF MOTION DEFICIT: ICD-10-CM

## 2020-03-03 LAB
AMPHET+METHAMPHET UR QL: NEGATIVE
AMPHETAMINES UR QL: NEGATIVE
BARBITURATES UR QL SCN: NEGATIVE
BENZODIAZ UR QL SCN: NEGATIVE
BUPRENORPHINE SERPL-MCNC: NEGATIVE NG/ML
CANNABINOIDS SERPL QL: NEGATIVE
COCAINE UR QL: NEGATIVE
METHADONE UR QL SCN: NEGATIVE
OPIATES UR QL: NEGATIVE
OXYCODONE UR QL SCN: POSITIVE
PCP UR QL SCN: NEGATIVE
PROPOXYPH UR QL: NEGATIVE
TRICYCLICS UR QL SCN: NEGATIVE

## 2020-03-03 PROCEDURE — 99204 OFFICE O/P NEW MOD 45 MIN: CPT | Performed by: INTERNAL MEDICINE

## 2020-03-03 PROCEDURE — 80306 DRUG TEST PRSMV INSTRMNT: CPT

## 2020-03-03 PROCEDURE — 97535 SELF CARE MNGMENT TRAINING: CPT

## 2020-03-03 PROCEDURE — 97110 THERAPEUTIC EXERCISES: CPT

## 2020-03-03 RX ORDER — NYSTATIN 100000 [USP'U]/G
1 POWDER TOPICAL DAILY PRN
COMMUNITY
Start: 2020-03-02

## 2020-03-03 RX ORDER — GABAPENTIN 100 MG/1
100 CAPSULE ORAL NIGHTLY
Qty: 30 CAPSULE | Refills: 0
Start: 2020-03-03 | End: 2020-04-02

## 2020-03-03 NOTE — PATIENT INSTRUCTIONS
1.  Increase Tramadol to 50mg - take 2 tablets at a time (1-2 hours apart, per your preference) scheduled morning, midday, and evening.    2.  Gabapentin - start 100mg at bedtime scheduled    3.  Senna - take 2 tablets twice daily if no BM in a week.  Continue until you have a BM    ALWAYS bring ALL of your medications prescribed by this clinic to EVERY appointment.  If you fail to bring in any remaining controlled medication (usually a pain or anxiety medication), you may not receive a refill or replacement prescription at that appointment.      Call [phone] for questions regarding medications, refills, or plan of care on Mondays - Fridays 9am to 4pm.      You must call AT LEAST 7-10 BUSINESS DAYS in advance for any refill requests. Clinic days are Tuesday and Thursdays at this time.  Prescriptions for controlled medications will be completed on clinic days only.  Please be aware of additional insurance prior authorization processing time required for many of those medications.      Call after hours and weekends only for new or acute (not chronic) symptom issues to speak to on-call physician or nurse practitioner.  Be advised that any requests for prescriptions for controlled substances can NOT be honored after hours, including refill requests.    Call [phone] only for scheduling issues.

## 2020-03-03 NOTE — THERAPY TREATMENT NOTE
Outpatient Occupational Therapy Lymphedema Treatment Note  [facility]     Patient Name: Asuncion Flynn  : 1970  MRN: [id number]  Today's Date: 3/3/2020      Visit Date: 2020    Patient Active Problem List   Diagnosis   • Recurrent cancer of left breast (CMS/HCC)   • Chronic anxiety   • Lymphedema of left upper extremity   • Memory loss   • Periodic headache syndrome, not intractable   • Palpitations   • Chest pain, atypical   • TIA (transient ischemic attack)   • Dyspnea on exertion   • Hyperlipidemia LDL goal <100        Past Medical History:   Diagnosis Date   • Anxiety associated with depression    • Breast cancer (CMS/HCC)    • Cancer (CMS/HCC)     hx of breast cancer x2   • Depression    • Drug therapy    • H/O mastectomy    • Hearing difficulty     Bilaterally. Recurrent ear infections as well as allergies and fluid in her ears.Not progressive.    • Hyperlipidemia    • Hypothyroidism    • Migraines 2019    complex   • Multiple allergies    • Panic disorder    • Twitching     2016: Little bit of twitching in the left upper extremity that began about 3 months or so ago, again it is not progressive in the slightest but it is noticeable.Left upper extremity movement disorder, non-progressive, could be related to anxiety.    • Vitamin D deficiency         Past Surgical History:   Procedure Laterality Date   • AUGMENTATION MAMMAPLASTY      reconstruction   • BREAST BIOPSY     • CARDIOVASCULAR STRESS TEST  2017    [facility]- 6 min 31 sec, 93% THR, 162/70, negative for ischemia   • ECHO - CONVERTED  2013    @[facility]. . EF 65%.   • ECHO - CONVERTED  2017    EF 60%, normal diastolic function, mild MR   • ECHO - CONVERTED  2019    ([facility]) Echo with bubble- EF 60%, no shunt, RVSP 15 mmHg    • MASTECTOMY Bilateral 2011   • OTHER SURGICAL HISTORY  2019    MRI brain- no infarct, sinusitis   • OTHER SURGICAL HISTORY  2019    CT head- no infarct   • OTHER  SURGICAL HISTORY  03/23/2019    CTA head and neck- normal          Visit Dx:      ICD-10-CM ICD-9-CM   1. Lymphedema of left upper extremity I89.0 457.1   2. Range of motion deficit M25.60 719.50   3. Pain in left arm M79.602 729.5               03/03/20 1100   Subjective Pain   Able to rate subjective pain? yes   Pre-Treatment Pain Level 10   Post-Treatment Pain Level 8   Subjective Comments   Subjective Comments Pt reports that her symptoms have become worse. Her pain is worse, her swelling is worse, and she is unable to tolerate any compression. She went to the doctor yesterday, but they were unable to get an ultrasound d/t the limited shoulder ROM. Pt and family are all very concerned that there is another blood clot and that is why symptoms have become worse.   Lymphedema Assessment   Lymphedema Classification LUE:;secondary;stage 2 (Spontaneously Irreversible)   Posture/Observations   Alignment Options Rounded shoulders;Scapular elevation   Rounded Shoulders Left:;Moderate   Scapular Elevation Left:;Severe   Posture/Observations Comments D/t pt's pain, she is guarding shoulder area and arm, causing significant scapular elevation, bringing shoulder up and neck down to meet to compensate for pain, which is only creating extra tension in shoulders/neck/back and more pain   Lymphedema Edema Assessment   Ptting Edema Category By severity   Pitting Edema Moderate   Skin Changes/Observations   Location/Assessment Upper Extremity;Upper Quadrant   Upper Extremity Conditions left:;intact;clean;dry   Upper Extremity Color/Pigment left:;normal   Upper Quadrant Conditions left:;intact;clean;dry   Upper Quadrant Color/Pigment left:;normal   Lymphedema Sensation   Lymphedema Sensation Reports LUE:;numbness;tingling   Lymphedema Measurements   Measurement Type(s) Quick Girth   Quick Girth Areas Upper extremities   LUE Quick Girth (cm)   Axilla 42 cm   Mid upper arm 42 cm   Elbow 39 cm   Mid forearm 36 cm   Wrist crease 22 cm    Web space 23.5 cm   Met-heads 22 cm   LUE Quick Girth Total 226.5   Compression/Skin Care   Compression/Skin Care skin care;compression garment   Skin Care moisturizing lotion applied   Compression Garment Comments Compressogrip size 4 and 6           OT Assessment/Plan     Row Name 03/03/20 1100          OT Assessment    Functional Limitations  Performance in self-care ADL;Performance in leisure activities;Limitations in functional capacity and performance;Limitations in community activities;Limitation in home management  -SG     Impairments  Edema;Impaired flexibility;Impaired lymphatic circulation;Sensation;Range of motion;Posture;Pain;Muscle strength;Joint mobility  -SG     Assessment Comments  Pt presents today in tears. She is in a significant amount of pain (even after 2 pain pills, reporting that it is a 10/10).  Pt's LUE does appear to be larger in size and swelling (see lymphedema flow sheet for larger measurements).  Pt reports that she is not able to get an ultrasound to check for DVT d/t limited ROM, so the entire session today was focused on LUE ROM w/ compression at the end. Pt was edu on progressed HEP, and stressed the importance of continuing with compression therapy. Pt will benefit from continuing with OT.  -SG     Patient/caregiver participated in establishment of treatment plan and goals  Yes  -SG     Patient would benefit from skilled therapy intervention  Yes  -SG        OT Plan    OT Frequency  2x/week  -SG     Planned CPT's?  OT EVAL MOD COMPLEXITY: 36593;OT THER ACT EA 15 MIN: [id number];OT THER PROC EA 15 MIN: 89896XN;OT SELF CARE/MGMT/TRAIN 15 MIN: 39527;OT MANUAL THERAPY EA 15 MIN: 39147  -SG     Planned Therapy Interventions (Optional Details)  home exercise program;manual therapy techniques;patient/family education;postural re-education;ROM (Range of Motion);strengthening;stretching  -SG     OT Plan Comments  Continue with CDT including MLD, compression therapy, skin care, and  exercise. Consider ASTYM/STM for tight soft tissue restrictions. Pursue appropriate compression garments and compression pump if appropriate  -SG       User Key  (r) = Recorded By, (t) = Taken By, (c) = Cosigned By    Initials Name Provider Type    Chyna Alvarado OTR/L Occupational Therapist            OT Exercises     Row Name 03/03/20 1100             Exercise 1    Exercise Name 1  LUE PROM (shoulder flexion, elbow flexion/extension, wrist flexion/extension, pronation/supination  -SG      Cueing 1  Demo;Verbal;Tactile  -SG      Sets 1  2 2 set for each movement  -SG      Reps 1  10 10 reps for each movement  -SG         Exercise 2    Exercise Name 2  LUE AAROM  -SG      Cueing 2  Demo;Tactile;Verbal  -SG      Equipment 2  Pulley  -SG         Exercise 3    Exercise Name 3  Seated ball push outs  -SG      Cueing 3  Demo;Tactile;Verbal  -SG      Equipment 3  Other Yoga ball  -SG      Sets 3  5  -SG      Reps 3  10  -SG         Exercise 4    Exercise Name 4  Seated ball push circles  -SG      Cueing 4  Demo;Tactile;Verbal  -SG      Equipment 4  Other Yoga ball  -SG      Sets 4  5  -SG      Reps 4  10  -SG        User Key  (r) = Recorded By, (t) = Taken By, (c) = Cosigned By    Initials Name Provider Type    Chyna Alvarado OTR/L Occupational Therapist              Therapy Education  Education Details: Pt was edu that if she is worried that there is still DVT, that MLD would be contraindicated. Pt was edu that she must continue with compression therapy, and we went over a schedule for wearing that she seemed more agreeable to. Pt was edu on new exercise to add to HEP: seated ball push outs with yoga ball  Given: HEP, Symptoms/condition management, Posture/body mechanics, Edema management  Program: New, Progressed  How Provided: Verbal, Demonstration, Written  Provided to: Patient, Caregiver  Level of Understanding: Verbalized, Demonstrated  01616 - OT Self Care/Mgmt Minutes: 15                Time Calculation:    OT Start Time: 1100     Therapy Charges for Today     Code Description Service Date Service Provider Modifiers Qty    23527711794 HC OT THER PROC EA 15 MIN 3/3/2020 Chyna Ramirez OTR/L GO 3    25139811667 HC OT SELF CARE/MGMT/TRAIN EA 15 MIN 3/3/2020 Chyna Ramirez OTR/L GO 1                      Chyna Ramirez OTR/L  3/3/2020

## 2020-03-06 NOTE — PROGRESS NOTES
Subjective   Asuncion Flynn is a 50 y.o. female.     History of Present Illness   Referring/Behavioral Health:  Monica Ivey APRN  Oncology:  Arcelia Rubi MD    50yowf with stage II left breast cancer, ER+, AK+ Her2 negative.  S/p bilateral mastectomy 2011, chemo, intolerant of aromatase inhibitors (mental health effects).  Recurrence to L axillary node, chemo again.  Complications of LUE DVT 12/2019.  PET 1/27/20 with axillary LNs.  Referred for pain management.  Seeing Monica Ivey for anxiety, cognitive impairment.    Pain: Continuous aching pain from L shoulder to fingers.  Frequent sporadic stabbing pains even at rest.  Unable to lie down, must sleep upright in recliner with arm propped up on pillows.  Any dependence of dropping arm causes excruciating pain.  Additionally, trying to lift arm to shoulder level (for ultrasound guided bx of LN) causes excruciating pain.  Reports known rotator cuff tear    NonPharm Modalities:  Lymphedema treatments    Medication management:  Sensitive to psychotropic SE of low dose opioids.  Percocet 2.5mg makes her nauseous and dizzy but it helps some.  She hallucinates with hydrocodone and codeine.  Tolerates Tramadol 50mg, takes every 4 hours during daytime.  It does take edge off pain.  Gabapentin 100mg dose was helpful in past at [place].  Intolerant to 300mg dose (sedation, dizziness).    ANALGESIA:  Not effective, still 8 or above and limits sleep, positioning for care  ADVERSE EFFECTS:  Psychotropic sensitivity.  No constipation on Tramadol.  Prior to opioid, usually had BM only once weekly.  ACTIVITY:  Using transport chair.  Assistance with ADLs due to LUE immobility  AFFECT:  Racing thoughts and anxiety managed with Lorazepam and counseling with CHASITY WALDROP  ABERRANT BEHAVIORS:  None documented.  UDT with oxycodone (prescribed    Support/Strengths:  Accompanied by aunt and mother today.  Lives in [place] with 12 yr old son.  Has short term  disability.    Distress/Difficulties: Depression and anxiety.  .  Son with behavioral health issues.    Substance Use History: none    ACP/Goals: pain management    The following portions of the patient's history were reviewed and updated as appropriate: allergies, current medications, past family history, past medical history, past social history, past surgical history and problem list.    Review of Systems  Otherwise negative except as below and as already detailed in HPI.    JEFFRY:  Reviewed.  See scanned form in Media. No concerns.  Consistent with history.  Prescribers identified as members of care team.     Medication Counts:  Reviewed.  See RN note. Did not bring medication to appointment    CONTROLLED SUBSTANCE TRACKING 3/3/2020   Last Jeffry 3/2/2020   Report Number [id number]   ORT Initial Risk Score 1   Diversion Concern Yes   Disposal Education and Agreement [id number]       UDS:  THC, Screen, Urine   Date Value Ref Range Status   03/03/2020 Negative Negative Final     Phencyclidine (PCP), Urine   Date Value Ref Range Status   03/03/2020 Negative Negative Final     Cocaine Screen, Urine   Date Value Ref Range Status   03/03/2020 Negative Negative Final     Methamphetamine, Ur   Date Value Ref Range Status   03/03/2020 Negative Negative Final     Opiate Screen   Date Value Ref Range Status   03/03/2020 Negative Negative Final     Amphetamine Screen, Urine   Date Value Ref Range Status   03/03/2020 Negative Negative Final     Benzodiazepine Screen, Urine   Date Value Ref Range Status   03/03/2020 Negative Negative Final     Tricyclic Antidepressants Screen   Date Value Ref Range Status   03/03/2020 Negative Negative Final     Methadone Screen, Urine   Date Value Ref Range Status   03/03/2020 Negative Negative Final     Barbiturates Screen, Urine   Date Value Ref Range Status   03/03/2020 Negative Negative Final     Oxycodone Screen, Urine   Date Value Ref Range Status   03/03/2020 Positive (A) Negative  Final     Propoxyphene Screen   Date Value Ref Range Status   03/03/2020 Negative Negative Final     Buprenorphine, Screen, Urine   Date Value Ref Range Status   03/03/2020 Negative Negative Final     Palliative Performance Scale  Palliative Performance Scale Score: 70%    [place] Symptom Assessment System Revised  Pain Score: worst possible pain   ESAS Tiredness Score: 8  ESAS Nausea Score: 6  ESAS Depression Score: 8  ESAS Anxiety Score: Worst possible anxiety  ESAS Drowsiness Score: 8  ESAS Lack of Appetite Score: 6  ESAS Wellbeing Score: Worst wellbeing  ESAS Dyspnea Score: 5  ESAS Other Problem Score: Worst possible response  ESAS Source of Information: patient    MARY KATE-7:    Over the last two weeks, how often have you been bothered by the following problems?  Feeling nervous, anxious or on edge: Nearly every day  Not being able to stop or control worrying: Nearly every day  Worrying too much about different things: Nearly every day  Trouble Relaxing: Nearly every day  Being so restless that it is hard to sit still: Nearly every day  Becoming easily annoyed or irritable: Nearly every day  Feeling afraid as if something awful might happen: Nearly every day  MARY KATE 7 Total Score: 21    PHQ-9:  PHQ-2/PHQ-9 Depression Screening 3/3/2020   Little interest or pleasure in doing things 3   Feeling down, depressed, or hopeless 3   Trouble falling or staying asleep, or sleeping too much 3   Feeling tired or having little energy 3   Poor appetite or overeating 3   Feeling bad about yourself - or that you are a failure or have let yourself or your family down 3   Trouble concentrating on things, such as reading the newspaper or watching television 3   Moving or speaking so slowly that other people could have noticed. Or the opposite - being so fidgety or restless that you have been moving around a lot more than usual 3   Thoughts that you would be better off dead, or of hurting yourself in some way 0   Total Score 24   If you  checked off any problems, how difficult have these problems made it for you to do your work, take care of things at home, or get along with other people? Extremely dIfficult        ECOG: (2) Ambulatory and capable of self care, unable to carry out work activity, up and about > 50% or waking hours    Objective   Physical Exam   Constitutional: She is oriented to person, place, and time. She appears well-developed and well-nourished. She appears distressed.   Eyes: Pupils are equal, round, and reactive to light. EOM are normal. No scleral icterus.   Pulmonary/Chest: Effort normal. No respiratory distress.   Abdominal: Soft. Bowel sounds are normal. She exhibits no distension. There is no tenderness.   Musculoskeletal: She exhibits edema, tenderness and deformity.   3+ LUE edema, sensitive to mild pressure.  Mild pain elicited to light touch/stroke   Neurological: She is alert and oriented to person, place, and time. A sensory deficit is present.   Skin: Skin is warm and dry. No rash noted. She is not diaphoretic. No erythema. No pallor.   Psychiatric: Judgment and thought content normal. Her mood appears anxious. Her speech is delayed. She is withdrawn. Cognition and memory are normal.   Look of pain, grimace, worried.   Nursing note and vitals reviewed.        Assessment/Plan   Asuncion was seen today for appointment and breast cancer.    Diagnoses and all orders for this visit:    Complex regional pain syndrome type 1 affecting left upper arm  -     Ambulatory Referral to Pain Management  -     gabapentin (NEURONTIN) 100 MG capsule; Take 1 capsule by mouth Every Night for 30 days.                 Prior authorization documentation:  Inefficacious:  APAP and NSAID alone  Intolerant to side effects: HCD, codeine, Percocet    Universal precautions:    ORT risk:  Low score  Aberrant behavior:  none  Indication:  CRPS, cancer associated pain  OME:  30mg  Naloxone prescribed:  no     Advance care plan:    -  Healthcare  decision making plan: mother is NOK  -  MOST discussions thus far:  No decision point, curative intent treatment      Patient Instructions of AVS:  1.  Increase Tramadol to 50mg - take 2 tablets at a time (1-2 hours apart, per your preference) scheduled morning, midday, and evening.    2.  Gabapentin - start 100mg at bedtime scheduled    3.  Senna - take 2 tablets twice daily if no BM in a week.  Continue until you have a BM      Total face to face time spent:  45 min.  Greater than 50% time spent in counseling and discussion re:  Benefits of Interventional Pain modalities given sensitivity to psychotropic systemic medications and severity of pain.      Monica Ivey, Behavioral Health, has seen patient.  Discussed at [facility] IDT - is good candidate for intervention (spinal cord stimulator, rhizotomy, IT pump, etc.)    With meds, as she tolerates Tramadol, will increase dose from 50mg four times daily to 100mg three times daily.  Restart Gabapentin 100mg at bedtime.  Discussed use of Tapentadol and Butrans or Belbuca as alternative opioids that may be better tolerated and better efficacy for neuropathic pain.    Care coordination:   -  Refer to Dr. Miller  -  F/u 4 weeks

## 2020-03-10 DIAGNOSIS — R52 PAIN: Primary | ICD-10-CM

## 2020-03-20 ENCOUNTER — DOCUMENTATION (OUTPATIENT)
Dept: OCCUPATIONAL THERAPY | Facility: HOSPITAL | Age: 50
End: 2020-03-20

## 2020-03-20 DIAGNOSIS — M79.602 PAIN IN LEFT ARM: ICD-10-CM

## 2020-03-20 DIAGNOSIS — M25.60 RANGE OF MOTION DEFICIT: ICD-10-CM

## 2020-03-20 DIAGNOSIS — I89.0 LYMPHEDEMA OF LEFT UPPER EXTREMITY: Primary | ICD-10-CM

## 2020-03-20 NOTE — THERAPY DISCHARGE NOTE
Outpatient Occupational Therapy Discharge Summary         Patient Name: Asuncion Flynn  : 1970  MRN: [id number]    Today's Date: 3/20/2020      Visit Date: 2020           OP OT Discharge Summary  Date of Discharge: 20  Reason for Discharge: Change in medical status  Discharge Instructions: Pt is being d/c from OP OT at this time d/t new blood clot. Pt was edu on contraindications w/ lymphedema tx and DVTs and pt verbalized understanding, reporting that she will follow instructions and guidelines from physician.      Time Calculation:                         Chyna Ramirez OTR/L   3/20/2020

## 2020-04-03 ENCOUNTER — APPOINTMENT (OUTPATIENT)
Dept: CT IMAGING | Facility: HOSPITAL | Age: 50
End: 2020-04-03